# Patient Record
Sex: FEMALE | Race: OTHER | NOT HISPANIC OR LATINO
[De-identification: names, ages, dates, MRNs, and addresses within clinical notes are randomized per-mention and may not be internally consistent; named-entity substitution may affect disease eponyms.]

---

## 2018-11-27 PROBLEM — Z00.00 ENCOUNTER FOR PREVENTIVE HEALTH EXAMINATION: Status: ACTIVE | Noted: 2018-11-27

## 2018-11-28 PROBLEM — N18.6 ESRD (END STAGE RENAL DISEASE) ON DIALYSIS: Status: RESOLVED | Noted: 2018-11-28 | Resolved: 2018-11-28

## 2018-11-28 PROBLEM — Z86.39 HISTORY OF HYPERLIPIDEMIA: Status: RESOLVED | Noted: 2018-11-28 | Resolved: 2018-11-28

## 2018-11-28 PROBLEM — Z86.711 HISTORY OF PULMONARY EMBOLISM: Status: RESOLVED | Noted: 2018-11-28 | Resolved: 2018-11-28

## 2018-11-28 PROBLEM — Z86.79 HISTORY OF HYPERTENSION: Status: RESOLVED | Noted: 2018-11-28 | Resolved: 2018-11-28

## 2018-11-28 PROBLEM — Z87.891 FORMER SMOKER: Status: ACTIVE | Noted: 2018-11-28

## 2018-12-05 ENCOUNTER — APPOINTMENT (OUTPATIENT)
Dept: CARDIOTHORACIC SURGERY | Facility: CLINIC | Age: 53
End: 2018-12-05
Payer: MEDICAID

## 2018-12-05 VITALS
RESPIRATION RATE: 18 BRPM | TEMPERATURE: 97.7 F | SYSTOLIC BLOOD PRESSURE: 161 MMHG | OXYGEN SATURATION: 95 % | HEIGHT: 61 IN | HEART RATE: 77 BPM | WEIGHT: 185 LBS | BODY MASS INDEX: 34.93 KG/M2 | DIASTOLIC BLOOD PRESSURE: 65 MMHG

## 2018-12-05 DIAGNOSIS — Z83.3 FAMILY HISTORY OF DIABETES MELLITUS: ICD-10-CM

## 2018-12-05 DIAGNOSIS — Z87.891 PERSONAL HISTORY OF NICOTINE DEPENDENCE: ICD-10-CM

## 2018-12-05 DIAGNOSIS — R06.09 OTHER FORMS OF DYSPNEA: ICD-10-CM

## 2018-12-05 DIAGNOSIS — I35.0 NONRHEUMATIC AORTIC (VALVE) STENOSIS: ICD-10-CM

## 2018-12-05 DIAGNOSIS — Z86.39 PERSONAL HISTORY OF OTHER ENDOCRINE, NUTRITIONAL AND METABOLIC DISEASE: ICD-10-CM

## 2018-12-05 DIAGNOSIS — Z99.2 END STAGE RENAL DISEASE: ICD-10-CM

## 2018-12-05 DIAGNOSIS — I50.9 HEART FAILURE, UNSPECIFIED: ICD-10-CM

## 2018-12-05 DIAGNOSIS — Z86.79 PERSONAL HISTORY OF OTHER DISEASES OF THE CIRCULATORY SYSTEM: ICD-10-CM

## 2018-12-05 DIAGNOSIS — Q24.5 MALFORMATION OF CORONARY VESSELS: ICD-10-CM

## 2018-12-05 DIAGNOSIS — R60.0 LOCALIZED EDEMA: ICD-10-CM

## 2018-12-05 DIAGNOSIS — Z86.711 PERSONAL HISTORY OF PULMONARY EMBOLISM: ICD-10-CM

## 2018-12-05 DIAGNOSIS — N18.6 END STAGE RENAL DISEASE: ICD-10-CM

## 2018-12-05 DIAGNOSIS — I20.89 OTHER FORMS OF ANGINA PECTORIS: ICD-10-CM

## 2018-12-05 PROCEDURE — 99205 OFFICE O/P NEW HI 60 MIN: CPT

## 2018-12-06 ENCOUNTER — FORM ENCOUNTER (OUTPATIENT)
Age: 53
End: 2018-12-06

## 2018-12-06 PROBLEM — Z83.3 FAMILY HISTORY OF DIABETES MELLITUS: Status: ACTIVE | Noted: 2018-12-06

## 2018-12-06 PROBLEM — I50.9 CHF NYHA CLASS III: Status: ACTIVE | Noted: 2018-12-06

## 2018-12-06 PROBLEM — R06.09 DOE (DYSPNEA ON EXERTION): Status: ACTIVE | Noted: 2018-12-06

## 2018-12-06 PROBLEM — Q24.5 ANOMALOUS RIGHT CORONARY ARTERY: Status: ACTIVE | Noted: 2018-12-06

## 2018-12-06 PROBLEM — R60.0 BILATERAL LOWER EXTREMITY EDEMA: Status: ACTIVE | Noted: 2018-12-06

## 2018-12-06 RX ORDER — ASPIRIN ENTERIC COATED TABLETS 81 MG 81 MG/1
81 TABLET, DELAYED RELEASE ORAL DAILY
Qty: 30 | Refills: 3 | Status: ACTIVE | COMMUNITY

## 2018-12-06 RX ORDER — ATORVASTATIN CALCIUM 40 MG/1
40 TABLET, FILM COATED ORAL
Qty: 30 | Refills: 1 | Status: ACTIVE | COMMUNITY

## 2018-12-06 RX ORDER — ENALAPRIL MALEATE 10 MG/1
10 TABLET ORAL DAILY
Qty: 30 | Refills: 0 | Status: ACTIVE | COMMUNITY

## 2018-12-06 RX ORDER — AMLODIPINE BESYLATE 10 MG/1
10 TABLET ORAL DAILY
Qty: 1 | Refills: 0 | Status: ACTIVE | COMMUNITY

## 2018-12-06 RX ORDER — GLIPIZIDE 5 MG/1
5 TABLET ORAL DAILY
Refills: 0 | Status: ACTIVE | COMMUNITY

## 2018-12-06 RX ORDER — METOPROLOL SUCCINATE 25 MG/1
25 TABLET, EXTENDED RELEASE ORAL
Refills: 0 | Status: ACTIVE | COMMUNITY

## 2018-12-06 RX ORDER — FUROSEMIDE 40 MG/1
40 TABLET ORAL
Refills: 0 | Status: ACTIVE | COMMUNITY

## 2018-12-07 ENCOUNTER — APPOINTMENT (OUTPATIENT)
Dept: CT IMAGING | Facility: HOSPITAL | Age: 53
End: 2018-12-07
Payer: MEDICAID

## 2018-12-07 ENCOUNTER — APPOINTMENT (OUTPATIENT)
Dept: ULTRASOUND IMAGING | Facility: HOSPITAL | Age: 53
End: 2018-12-07
Payer: MEDICAID

## 2018-12-07 ENCOUNTER — OUTPATIENT (OUTPATIENT)
Dept: OUTPATIENT SERVICES | Facility: HOSPITAL | Age: 53
LOS: 1 days | End: 2018-12-07
Payer: MEDICAID

## 2018-12-07 ENCOUNTER — NON-APPOINTMENT (OUTPATIENT)
Age: 53
End: 2018-12-07

## 2018-12-07 DIAGNOSIS — I35.0 NONRHEUMATIC AORTIC (VALVE) STENOSIS: ICD-10-CM

## 2018-12-07 DIAGNOSIS — Q24.5 MALFORMATION OF CORONARY VESSELS: ICD-10-CM

## 2018-12-07 LAB
ALBUMIN SERPL ELPH-MCNC: 4.2 G/DL — SIGNIFICANT CHANGE UP (ref 3.3–5)
ALP SERPL-CCNC: 110 U/L — SIGNIFICANT CHANGE UP (ref 40–120)
ALT FLD-CCNC: 11 U/L — SIGNIFICANT CHANGE UP (ref 10–45)
ANION GAP SERPL CALC-SCNC: 18 MMOL/L — HIGH (ref 5–17)
APTT BLD: 28.1 SEC — SIGNIFICANT CHANGE UP (ref 27.5–36.3)
AST SERPL-CCNC: 11 U/L — SIGNIFICANT CHANGE UP (ref 10–40)
BASOPHILS NFR BLD AUTO: 0.1 % — SIGNIFICANT CHANGE UP (ref 0–2)
BILIRUB SERPL-MCNC: 0.3 MG/DL — SIGNIFICANT CHANGE UP (ref 0.2–1.2)
BLD GP AB SCN SERPL QL: NEGATIVE — SIGNIFICANT CHANGE UP
BUN SERPL-MCNC: 44 MG/DL — HIGH (ref 7–23)
CALCIUM SERPL-MCNC: 10.4 MG/DL — SIGNIFICANT CHANGE UP (ref 8.4–10.5)
CHLORIDE SERPL-SCNC: 94 MMOL/L — LOW (ref 96–108)
CO2 SERPL-SCNC: 22 MMOL/L — SIGNIFICANT CHANGE UP (ref 22–31)
CREAT SERPL-MCNC: 5.39 MG/DL — HIGH (ref 0.5–1.3)
EOSINOPHIL NFR BLD AUTO: 2.8 % — SIGNIFICANT CHANGE UP (ref 0–6)
GLUCOSE SERPL-MCNC: 178 MG/DL — HIGH (ref 70–99)
HBA1C BLD-MCNC: 9.1 % — HIGH (ref 4–5.6)
HBV SURFACE AG SER-ACNC: SIGNIFICANT CHANGE UP
HCT VFR BLD CALC: 36.5 % — SIGNIFICANT CHANGE UP (ref 34.5–45)
HGB BLD-MCNC: 11.7 G/DL — SIGNIFICANT CHANGE UP (ref 11.5–15.5)
INR BLD: 1.07 — SIGNIFICANT CHANGE UP (ref 0.88–1.16)
LYMPHOCYTES # BLD AUTO: 20.2 % — SIGNIFICANT CHANGE UP (ref 13–44)
MCHC RBC-ENTMCNC: 28.2 PG — SIGNIFICANT CHANGE UP (ref 27–34)
MCHC RBC-ENTMCNC: 32.1 G/DL — SIGNIFICANT CHANGE UP (ref 32–36)
MCV RBC AUTO: 88 FL — SIGNIFICANT CHANGE UP (ref 80–100)
MONOCYTES NFR BLD AUTO: 6.3 % — SIGNIFICANT CHANGE UP (ref 2–14)
NEUTROPHILS NFR BLD AUTO: 70.6 % — SIGNIFICANT CHANGE UP (ref 43–77)
PLATELET # BLD AUTO: 149 K/UL — LOW (ref 150–400)
POTASSIUM SERPL-MCNC: 4.6 MMOL/L — SIGNIFICANT CHANGE UP (ref 3.5–5.3)
POTASSIUM SERPL-SCNC: 4.6 MMOL/L — SIGNIFICANT CHANGE UP (ref 3.5–5.3)
PROT SERPL-MCNC: 8.3 G/DL — SIGNIFICANT CHANGE UP (ref 6–8.3)
PROTHROM AB SERPL-ACNC: 12.1 SEC — SIGNIFICANT CHANGE UP (ref 10–12.9)
RBC # BLD: 4.15 M/UL — SIGNIFICANT CHANGE UP (ref 3.8–5.2)
RBC # FLD: 14.9 % — SIGNIFICANT CHANGE UP (ref 10.3–16.9)
RH IG SCN BLD-IMP: POSITIVE — SIGNIFICANT CHANGE UP
SODIUM SERPL-SCNC: 134 MMOL/L — LOW (ref 135–145)
TSH SERPL-MCNC: 2.64 UIU/ML — SIGNIFICANT CHANGE UP (ref 0.35–4.94)
WBC # BLD: 7.5 K/UL — SIGNIFICANT CHANGE UP (ref 3.8–10.5)
WBC # FLD AUTO: 7.5 K/UL — SIGNIFICANT CHANGE UP (ref 3.8–10.5)

## 2018-12-07 PROCEDURE — 94760 N-INVAS EAR/PLS OXIMETRY 1: CPT

## 2018-12-07 PROCEDURE — 94727 GAS DIL/WSHOT DETER LNG VOL: CPT | Mod: 26

## 2018-12-07 PROCEDURE — 71046 X-RAY EXAM CHEST 2 VIEWS: CPT | Mod: 26

## 2018-12-07 PROCEDURE — 94726 PLETHYSMOGRAPHY LUNG VOLUMES: CPT

## 2018-12-07 PROCEDURE — 75574 CT ANGIO HRT W/3D IMAGE: CPT | Mod: 26

## 2018-12-07 PROCEDURE — 94060 EVALUATION OF WHEEZING: CPT

## 2018-12-07 PROCEDURE — 93880 EXTRACRANIAL BILAT STUDY: CPT | Mod: 26

## 2018-12-07 PROCEDURE — 94010 BREATHING CAPACITY TEST: CPT | Mod: 26

## 2018-12-07 PROCEDURE — 71046 X-RAY EXAM CHEST 2 VIEWS: CPT

## 2018-12-07 PROCEDURE — 93880 EXTRACRANIAL BILAT STUDY: CPT

## 2018-12-07 PROCEDURE — 94729 DIFFUSING CAPACITY: CPT | Mod: 26

## 2018-12-07 PROCEDURE — 94729 DIFFUSING CAPACITY: CPT

## 2018-12-07 PROCEDURE — 75574 CT ANGIO HRT W/3D IMAGE: CPT

## 2018-12-10 VITALS
SYSTOLIC BLOOD PRESSURE: 161 MMHG | HEART RATE: 77 BPM | OXYGEN SATURATION: 95 % | DIASTOLIC BLOOD PRESSURE: 65 MMHG | HEIGHT: 61 IN | WEIGHT: 184.97 LBS | RESPIRATION RATE: 16 BRPM | TEMPERATURE: 98 F

## 2018-12-10 PROBLEM — Z86.39 HISTORY OF DIABETES MELLITUS: Status: RESOLVED | Noted: 2018-12-10 | Resolved: 2018-12-10

## 2018-12-10 NOTE — H&P ADULT - ASSESSMENT
53 year old female, former smoker, with a past medical history of hypertension, DM, hyperlipidemia, PE (2016, on anticoagulation for 6 months), ESRD on dialysis (Tues, Thurs, Sat.), iron deficient anemia, consulted by CT Surgery for evaluation of aortic stenosis and an anomalous RCA.     Dr. Nascimento reviewed the echocardiogram and cardiac cath images with the patient and son and discussed the case with Dr. India Haley. Dr. Nascimento discussed the risks, benefits and alternatives to surgery. Risks include but not limited to death, heart attack, bleeding, stroke, kidney problems and infection. He quoted a 2-3% operative mortality and complication risk. Dr. Nascimento feels the patient will benefit and is a candidate for Sternotomy, AVR, Unroofing of Right Coronary Artery, Possible Bypass. All questions were addressed and patient presents today for elective surgery.     Plan  Coronary CTA, PFT, CXR, labs, EKG completed  Patient instructed to continue Aspirin  HD Tues/Thurs/Saturday with short run of dialysis as outpatient on Monday 12/10/18  3 soaps given  SDA- Sternotomy, AVR, Unroofing of Right Coronary Artery, Possible Bypass 53 year old female, former smoker, with a past medical history of hypertension, DM, hyperlipidemia, PE (2016, on anticoagulation for 6 months), ESRD on dialysis (Tues, Thurs, Sat.), iron deficient anemia, consulted by CT Surgery for evaluation of aortic stenosis and an anomalous RCA.     Dr. Nascimento reviewed the echocardiogram and cardiac cath images with the patient and son and discussed the case with Dr. India Haley. Dr. Nascimento discussed the risks, benefits and alternatives to surgery. Risks include but not limited to death, heart attack, bleeding, stroke, kidney problems and infection. He quoted a 2-3% operative mortality and complication risk. Dr. Nascimento feels the patient will benefit and is a candidate for Sternotomy, AVR, Unroofing of Right Coronary Artery, Possible Bypass. All questions were addressed and patient presents today for elective surgery.     Plan  Coronary CTA, PFT, CXR, labs, EKG completed  Patient instructed to continue Aspirin  HD Tues/Thurs/Saturday   3 soaps given  SDA- Sternotomy, AVR, Unroofing of Right Coronary Artery, Possible Bypass

## 2018-12-10 NOTE — H&P ADULT - PMH
Diabetes    ESRD (end stage renal disease)    Essential hypertension    Hemodialysis access, AV graft    Hyperlipemia    Kidney failure    Pulmonary embolism

## 2018-12-10 NOTE — H&P ADULT - NSHPLABSRESULTS_GEN_ALL_CORE
Hgb A1C =9.1  creat = 5.39  hct= 36.5  hgb= 11.7  plt= 149  WBC= 7.5  INR= 1.07  tot alb=4.2  tot bili=0.3    TSH= 2.644    pft:FEV1 1.59, 69% pred  Carotids: no hemodynamically significant carotid stenosis    Chest xray: lungs clear  EKG: NSR HR 76      Echocardiogram 11/7/18 revealed: LVEF 55-60%. aortic valve is trileaflet and is moderately thickened. moderate aortic regurgitation. severe aortic stenosis with a peak gradient of 77.43mmhg and mean gradient of 49.28mmHg.     Cardiac catheterization on 11/7/18 revealed: nonobstructive CAD. 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    CT Angio heart with coronaries: moderate coronary artery calcification, 6 mm tubular shaped nodule within the left lower lobe with some adjacent tree in bud micronodular opacities compatible with small large airway inflammation (repeat CT in 6-12 months recommendation), anomalous RCA from left coronary cusp with acute angle takeoff and slit like orifice

## 2018-12-10 NOTE — H&P ADULT - HISTORY OF PRESENT ILLNESS
53 year old female, Croatian speaking, former smoker, with a past medical history of hypertension, hyperlipidemia, PE (2016, on anticoagulation for 6 months), ESRD on dialysis (Tues, Thurs, Sat.), iron deficient anemia, consulted by CT Surgery for management of aortic stenosis and an anomalous RCA.     Patient presented to her cardiologist Dr. India Haley complaining of exertional dyspnea and angina with moderate activity (beginning since April). She reports that she gets winded after walking up 1 flight of stairs and 1-2 city blocks. She also endorses bilateral lower extremity edema. Patient denies any fever, chills, fatigue, syncope, acute chest pain, palpitations, SOB, orthopnea, paroxysmal nocturnal dyspnea, vomiting, abdominal pain, or back pain.    Due to patient's symptoms, she underwent the tests below:    Cardiac catheterization on 11/7/18 revealed: nonobstructive CAD. 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     Echocardiogram 11/7/18 revealed: LVEF 55-60%. aortic valve is trileaflet and is moderately thickened. moderate aortic regurgitation. severe aortic stenosis with a peak gradient of 77.43mmhg and mean gradient of 49.28mmHg.     Patient lives with son. She remains independent with all her ADLs. She admits to a mild activity level due to her symptoms. She presents today for elective surgery.    ***Patient receives hemodialysis on Tuesday, Thursday, and Saturday as an outpatient. She received an extra course of HD on Monday 12/10/18. 53 year old female, Scottish speaking, former smoker, with a past medical history of hypertension, hyperlipidemia, PE (2016, on anticoagulation for 6 months), ESRD on dialysis (Tues, Thurs, Sat.), iron deficient anemia, consulted by CT Surgery for management of aortic stenosis and an anomalous RCA.     Patient presented to her cardiologist Dr. India Haley complaining of exertional dyspnea and angina with moderate activity (beginning since April). She reports that she gets winded after walking up 1 flight of stairs and 1-2 city blocks. She also endorses bilateral lower extremity edema. Patient denies any fever, chills, fatigue, syncope, acute chest pain, palpitations, SOB, orthopnea, paroxysmal nocturnal dyspnea, vomiting, abdominal pain, or back pain.    Due to patient's symptoms, she underwent the tests below:    Cardiac catheterization on 11/7/18 revealed: nonobstructive CAD. 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     Echocardiogram 11/7/18 revealed: LVEF 55-60%. aortic valve is trileaflet and is moderately thickened. moderate aortic regurgitation. severe aortic stenosis with a peak gradient of 77.43mmhg and mean gradient of 49.28mmHg.     Patient lives with son. She remains independent with all her ADLs. She admits to a mild activity level due to her symptoms. She presents today for elective surgery.    ***Patient receives hemodialysis on Tuesday, Thursday, and Saturday as an outpatient.

## 2018-12-17 ENCOUNTER — INPATIENT (INPATIENT)
Facility: HOSPITAL | Age: 53
LOS: 2 days | DRG: 219 | End: 2018-12-20
Attending: THORACIC SURGERY (CARDIOTHORACIC VASCULAR SURGERY) | Admitting: THORACIC SURGERY (CARDIOTHORACIC VASCULAR SURGERY)
Payer: MEDICAID

## 2018-12-17 ENCOUNTER — APPOINTMENT (OUTPATIENT)
Dept: CARDIOTHORACIC SURGERY | Facility: HOSPITAL | Age: 53
End: 2018-12-17
Payer: MEDICAID

## 2018-12-17 ENCOUNTER — RESULT REVIEW (OUTPATIENT)
Age: 53
End: 2018-12-17

## 2018-12-17 DIAGNOSIS — D64.9 ANEMIA, UNSPECIFIED: ICD-10-CM

## 2018-12-17 DIAGNOSIS — I10 ESSENTIAL (PRIMARY) HYPERTENSION: ICD-10-CM

## 2018-12-17 DIAGNOSIS — N25.0 RENAL OSTEODYSTROPHY: ICD-10-CM

## 2018-12-17 DIAGNOSIS — N18.6 END STAGE RENAL DISEASE: ICD-10-CM

## 2018-12-17 DIAGNOSIS — Z98.891 HISTORY OF UTERINE SCAR FROM PREVIOUS SURGERY: Chronic | ICD-10-CM

## 2018-12-17 LAB
ALBUMIN SERPL ELPH-MCNC: 2.7 G/DL — LOW (ref 3.3–5)
ALBUMIN SERPL ELPH-MCNC: 3.3 G/DL — SIGNIFICANT CHANGE UP (ref 3.3–5)
ALP SERPL-CCNC: 56 U/L — SIGNIFICANT CHANGE UP (ref 40–120)
ALP SERPL-CCNC: 76 U/L — SIGNIFICANT CHANGE UP (ref 40–120)
ALT FLD-CCNC: 15 U/L — SIGNIFICANT CHANGE UP (ref 10–45)
ALT FLD-CCNC: 19 U/L — SIGNIFICANT CHANGE UP (ref 10–45)
ANION GAP SERPL CALC-SCNC: 20 MMOL/L — HIGH (ref 5–17)
ANION GAP SERPL CALC-SCNC: 23 MMOL/L — HIGH (ref 5–17)
APTT BLD: 28.7 SEC — SIGNIFICANT CHANGE UP (ref 27.5–36.3)
APTT BLD: 29.5 SEC — SIGNIFICANT CHANGE UP (ref 27.5–36.3)
AST SERPL-CCNC: 41 U/L — HIGH (ref 10–40)
AST SERPL-CCNC: 45 U/L — HIGH (ref 10–40)
BASE EXCESS BLDA CALC-SCNC: -4.8 MMOL/L — LOW (ref -2–3)
BASOPHILS NFR BLD AUTO: 0.1 % — SIGNIFICANT CHANGE UP (ref 0–2)
BILIRUB SERPL-MCNC: 0.6 MG/DL — SIGNIFICANT CHANGE UP (ref 0.2–1.2)
BILIRUB SERPL-MCNC: 0.6 MG/DL — SIGNIFICANT CHANGE UP (ref 0.2–1.2)
BLD GP AB SCN SERPL QL: NEGATIVE — SIGNIFICANT CHANGE UP
BUN SERPL-MCNC: 41 MG/DL — HIGH (ref 7–23)
BUN SERPL-MCNC: 49 MG/DL — HIGH (ref 7–23)
CALCIUM SERPL-MCNC: 10.1 MG/DL — SIGNIFICANT CHANGE UP (ref 8.4–10.5)
CALCIUM SERPL-MCNC: 10.8 MG/DL — HIGH (ref 8.4–10.5)
CHLORIDE SERPL-SCNC: 102 MMOL/L — SIGNIFICANT CHANGE UP (ref 96–108)
CHLORIDE SERPL-SCNC: 105 MMOL/L — SIGNIFICANT CHANGE UP (ref 96–108)
CO2 SERPL-SCNC: 19 MMOL/L — LOW (ref 22–31)
CO2 SERPL-SCNC: 21 MMOL/L — LOW (ref 22–31)
CREAT SERPL-MCNC: 4.49 MG/DL — HIGH (ref 0.5–1.3)
CREAT SERPL-MCNC: 5.03 MG/DL — HIGH (ref 0.5–1.3)
EOSINOPHIL NFR BLD AUTO: 0.2 % — SIGNIFICANT CHANGE UP (ref 0–6)
GAS PNL BLDA: SIGNIFICANT CHANGE UP
GAS PNL BLDV: SIGNIFICANT CHANGE UP
GLUCOSE BLDC GLUCOMTR-MCNC: 151 MG/DL — HIGH (ref 70–99)
GLUCOSE BLDC GLUCOMTR-MCNC: 163 MG/DL — HIGH (ref 70–99)
GLUCOSE BLDC GLUCOMTR-MCNC: 225 MG/DL — HIGH (ref 70–99)
GLUCOSE BLDC GLUCOMTR-MCNC: 254 MG/DL — HIGH (ref 70–99)
GLUCOSE BLDC GLUCOMTR-MCNC: 276 MG/DL — HIGH (ref 70–99)
GLUCOSE SERPL-MCNC: 188 MG/DL — HIGH (ref 70–99)
GLUCOSE SERPL-MCNC: 256 MG/DL — HIGH (ref 70–99)
HBV CORE IGM SER-ACNC: SIGNIFICANT CHANGE UP
HBV SURFACE AB SER-ACNC: REACTIVE — SIGNIFICANT CHANGE UP
HBV SURFACE AG SER-ACNC: SIGNIFICANT CHANGE UP
HCG SERPL-ACNC: 11.6 MIU/ML — HIGH
HCO3 BLDA-SCNC: 22 MMOL/L — SIGNIFICANT CHANGE UP (ref 21–28)
HCT VFR BLD CALC: 28.2 % — LOW (ref 34.5–45)
HCT VFR BLD CALC: 30 % — LOW (ref 34.5–45)
HCV AB S/CO SERPL IA: 0.19 S/CO — SIGNIFICANT CHANGE UP
HCV AB SERPL-IMP: SIGNIFICANT CHANGE UP
HGB BLD-MCNC: 9.1 G/DL — LOW (ref 11.5–15.5)
HGB BLD-MCNC: 9.9 G/DL — LOW (ref 11.5–15.5)
INR BLD: 1.23 — HIGH (ref 0.88–1.16)
INR BLD: 1.3 — HIGH (ref 0.88–1.16)
LACTATE SERPL-SCNC: 5.1 MMOL/L — CRITICAL HIGH (ref 0.5–2)
LACTATE SERPL-SCNC: 6.7 MMOL/L — CRITICAL HIGH (ref 0.5–2)
LYMPHOCYTES # BLD AUTO: 8.1 % — LOW (ref 13–44)
MAGNESIUM SERPL-MCNC: 2.3 MG/DL — SIGNIFICANT CHANGE UP (ref 1.6–2.6)
MAGNESIUM SERPL-MCNC: 2.5 MG/DL — SIGNIFICANT CHANGE UP (ref 1.6–2.6)
MCHC RBC-ENTMCNC: 28.6 PG — SIGNIFICANT CHANGE UP (ref 27–34)
MCHC RBC-ENTMCNC: 29.4 PG — SIGNIFICANT CHANGE UP (ref 27–34)
MCHC RBC-ENTMCNC: 32.3 G/DL — SIGNIFICANT CHANGE UP (ref 32–36)
MCHC RBC-ENTMCNC: 33 G/DL — SIGNIFICANT CHANGE UP (ref 32–36)
MCV RBC AUTO: 88.7 FL — SIGNIFICANT CHANGE UP (ref 80–100)
MCV RBC AUTO: 89 FL — SIGNIFICANT CHANGE UP (ref 80–100)
MONOCYTES NFR BLD AUTO: 8.2 % — SIGNIFICANT CHANGE UP (ref 2–14)
NEUTROPHILS NFR BLD AUTO: 83.4 % — HIGH (ref 43–77)
PCO2 BLDA: 48 MMHG — HIGH (ref 32–45)
PH BLDA: 7.28 — LOW (ref 7.35–7.45)
PHOSPHATE SERPL-MCNC: 1.7 MG/DL — LOW (ref 2.5–4.5)
PHOSPHATE SERPL-MCNC: 3.4 MG/DL — SIGNIFICANT CHANGE UP (ref 2.5–4.5)
PLATELET # BLD AUTO: 106 K/UL — LOW (ref 150–400)
PLATELET # BLD AUTO: 167 K/UL — SIGNIFICANT CHANGE UP (ref 150–400)
PO2 BLDA: 482 MMHG — HIGH (ref 83–108)
POTASSIUM SERPL-MCNC: 3.5 MMOL/L — SIGNIFICANT CHANGE UP (ref 3.5–5.3)
POTASSIUM SERPL-MCNC: 3.5 MMOL/L — SIGNIFICANT CHANGE UP (ref 3.5–5.3)
POTASSIUM SERPL-SCNC: 3.5 MMOL/L — SIGNIFICANT CHANGE UP (ref 3.5–5.3)
POTASSIUM SERPL-SCNC: 3.5 MMOL/L — SIGNIFICANT CHANGE UP (ref 3.5–5.3)
PROT SERPL-MCNC: 5.3 G/DL — LOW (ref 6–8.3)
PROT SERPL-MCNC: 6.1 G/DL — SIGNIFICANT CHANGE UP (ref 6–8.3)
PROTHROM AB SERPL-ACNC: 14 SEC — HIGH (ref 10–12.9)
PROTHROM AB SERPL-ACNC: 14.8 SEC — HIGH (ref 10–12.9)
RBC # BLD: 3.18 M/UL — LOW (ref 3.8–5.2)
RBC # BLD: 3.37 M/UL — LOW (ref 3.8–5.2)
RBC # FLD: 14.1 % — SIGNIFICANT CHANGE UP (ref 10.3–16.9)
RBC # FLD: 14.5 % — SIGNIFICANT CHANGE UP (ref 10.3–16.9)
RH IG SCN BLD-IMP: POSITIVE — SIGNIFICANT CHANGE UP
SAO2 % BLDA: 100 % — SIGNIFICANT CHANGE UP (ref 95–100)
SODIUM SERPL-SCNC: 144 MMOL/L — SIGNIFICANT CHANGE UP (ref 135–145)
SODIUM SERPL-SCNC: 146 MMOL/L — HIGH (ref 135–145)
WBC # BLD: 12.3 K/UL — HIGH (ref 3.8–10.5)
WBC # BLD: 9.4 K/UL — SIGNIFICANT CHANGE UP (ref 3.8–10.5)
WBC # FLD AUTO: 12.3 K/UL — HIGH (ref 3.8–10.5)
WBC # FLD AUTO: 9.4 K/UL — SIGNIFICANT CHANGE UP (ref 3.8–10.5)

## 2018-12-17 PROCEDURE — 93010 ELECTROCARDIOGRAM REPORT: CPT

## 2018-12-17 PROCEDURE — 33426 REPAIR OF MITRAL VALVE: CPT | Mod: 80

## 2018-12-17 PROCEDURE — 71045 X-RAY EXAM CHEST 1 VIEW: CPT | Mod: 26,59,76

## 2018-12-17 PROCEDURE — 86901 BLOOD TYPING SEROLOGIC RH(D): CPT

## 2018-12-17 PROCEDURE — 86850 RBC ANTIBODY SCREEN: CPT

## 2018-12-17 PROCEDURE — 80053 COMPREHEN METABOLIC PANEL: CPT

## 2018-12-17 PROCEDURE — 33405 REPLACEMENT AORTIC VALVE OPN: CPT | Mod: 80,59

## 2018-12-17 PROCEDURE — 86900 BLOOD TYPING SEROLOGIC ABO: CPT

## 2018-12-17 PROCEDURE — 93306 TTE W/DOPPLER COMPLETE: CPT | Mod: 26,77

## 2018-12-17 PROCEDURE — 84443 ASSAY THYROID STIM HORMONE: CPT

## 2018-12-17 PROCEDURE — 85610 PROTHROMBIN TIME: CPT

## 2018-12-17 PROCEDURE — 83036 HEMOGLOBIN GLYCOSYLATED A1C: CPT

## 2018-12-17 PROCEDURE — 36430 TRANSFUSION BLD/BLD COMPNT: CPT

## 2018-12-17 PROCEDURE — 33507 REPAIR ART INTRAMURAL: CPT | Mod: 80,59

## 2018-12-17 PROCEDURE — 33405 REPLACEMENT AORTIC VALVE OPN: CPT | Mod: 59

## 2018-12-17 PROCEDURE — 85025 COMPLETE CBC W/AUTO DIFF WBC: CPT

## 2018-12-17 PROCEDURE — 87340 HEPATITIS B SURFACE AG IA: CPT

## 2018-12-17 PROCEDURE — 36556 INSERT NON-TUNNEL CV CATH: CPT

## 2018-12-17 PROCEDURE — 36415 COLL VENOUS BLD VENIPUNCTURE: CPT

## 2018-12-17 PROCEDURE — 93306 TTE W/DOPPLER COMPLETE: CPT | Mod: 26

## 2018-12-17 PROCEDURE — 33426 REPAIR OF MITRAL VALVE: CPT

## 2018-12-17 PROCEDURE — 99291 CRITICAL CARE FIRST HOUR: CPT

## 2018-12-17 PROCEDURE — 85730 THROMBOPLASTIN TIME PARTIAL: CPT

## 2018-12-17 PROCEDURE — 33507 REPAIR ART INTRAMURAL: CPT | Mod: 59

## 2018-12-17 RX ORDER — METOPROLOL TARTRATE 50 MG
1 TABLET ORAL
Qty: 0 | Refills: 0 | COMMUNITY

## 2018-12-17 RX ORDER — HEPARIN SODIUM 5000 [USP'U]/ML
5000 INJECTION INTRAVENOUS; SUBCUTANEOUS EVERY 8 HOURS
Qty: 0 | Refills: 0 | Status: DISCONTINUED | OUTPATIENT
Start: 2018-12-17 | End: 2018-12-19

## 2018-12-17 RX ORDER — SODIUM BICARBONATE 1 MEQ/ML
50 SYRINGE (ML) INTRAVENOUS ONCE
Qty: 0 | Refills: 0 | Status: COMPLETED | OUTPATIENT
Start: 2018-12-17 | End: 2018-12-17

## 2018-12-17 RX ORDER — AMLODIPINE BESYLATE 2.5 MG/1
1 TABLET ORAL
Qty: 0 | Refills: 0 | COMMUNITY

## 2018-12-17 RX ORDER — CHLORHEXIDINE GLUCONATE 213 G/1000ML
5 SOLUTION TOPICAL EVERY 4 HOURS
Qty: 0 | Refills: 0 | Status: DISCONTINUED | OUTPATIENT
Start: 2018-12-17 | End: 2018-12-20

## 2018-12-17 RX ORDER — DEXTROSE 50 % IN WATER 50 %
50 SYRINGE (ML) INTRAVENOUS
Qty: 0 | Refills: 0 | Status: DISCONTINUED | OUTPATIENT
Start: 2018-12-17 | End: 2018-12-20

## 2018-12-17 RX ORDER — CEFAZOLIN SODIUM 1 G
2000 VIAL (EA) INJECTION EVERY 8 HOURS
Qty: 0 | Refills: 0 | Status: DISCONTINUED | OUTPATIENT
Start: 2018-12-17 | End: 2018-12-18

## 2018-12-17 RX ORDER — ATORVASTATIN CALCIUM 80 MG/1
1 TABLET, FILM COATED ORAL
Qty: 0 | Refills: 0 | COMMUNITY

## 2018-12-17 RX ORDER — AMIODARONE HYDROCHLORIDE 400 MG/1
1 TABLET ORAL
Qty: 900 | Refills: 0 | Status: DISCONTINUED | OUTPATIENT
Start: 2018-12-17 | End: 2018-12-18

## 2018-12-17 RX ORDER — FUROSEMIDE 40 MG
1 TABLET ORAL
Qty: 0 | Refills: 0 | COMMUNITY

## 2018-12-17 RX ORDER — POTASSIUM PHOSPHATE, MONOBASIC POTASSIUM PHOSPHATE, DIBASIC 236; 224 MG/ML; MG/ML
15 INJECTION, SOLUTION INTRAVENOUS ONCE
Qty: 0 | Refills: 0 | Status: COMPLETED | OUTPATIENT
Start: 2018-12-17 | End: 2018-12-18

## 2018-12-17 RX ORDER — CISATRACURIUM BESYLATE 2 MG/ML
10 INJECTION INTRAVENOUS ONCE
Qty: 0 | Refills: 0 | Status: COMPLETED | OUTPATIENT
Start: 2018-12-17 | End: 2018-12-17

## 2018-12-17 RX ORDER — SODIUM CHLORIDE 9 MG/ML
1000 INJECTION INTRAMUSCULAR; INTRAVENOUS; SUBCUTANEOUS
Qty: 0 | Refills: 0 | Status: DISCONTINUED | OUTPATIENT
Start: 2018-12-17 | End: 2018-12-20

## 2018-12-17 RX ORDER — ALBUMIN HUMAN 25 %
50 VIAL (ML) INTRAVENOUS ONCE
Qty: 0 | Refills: 0 | Status: DISCONTINUED | OUTPATIENT
Start: 2018-12-17 | End: 2018-12-19

## 2018-12-17 RX ORDER — INSULIN GLARGINE 100 [IU]/ML
0 INJECTION, SOLUTION SUBCUTANEOUS
Qty: 0 | Refills: 0 | COMMUNITY

## 2018-12-17 RX ORDER — CHLORHEXIDINE GLUCONATE 213 G/1000ML
1 SOLUTION TOPICAL DAILY
Qty: 0 | Refills: 0 | Status: DISCONTINUED | OUTPATIENT
Start: 2018-12-17 | End: 2018-12-20

## 2018-12-17 RX ORDER — MILRINONE LACTATE 1 MG/ML
0.25 INJECTION, SOLUTION INTRAVENOUS
Qty: 20 | Refills: 0 | Status: DISCONTINUED | OUTPATIENT
Start: 2018-12-17 | End: 2018-12-20

## 2018-12-17 RX ORDER — ASPIRIN/CALCIUM CARB/MAGNESIUM 324 MG
1 TABLET ORAL
Qty: 0 | Refills: 0 | COMMUNITY

## 2018-12-17 RX ORDER — NOREPINEPHRINE BITARTRATE/D5W 8 MG/250ML
0.02 PLASTIC BAG, INJECTION (ML) INTRAVENOUS
Qty: 8 | Refills: 0 | Status: DISCONTINUED | OUTPATIENT
Start: 2018-12-17 | End: 2018-12-20

## 2018-12-17 RX ORDER — FENTANYL CITRATE 50 UG/ML
25 INJECTION INTRAVENOUS ONCE
Qty: 0 | Refills: 0 | Status: DISCONTINUED | OUTPATIENT
Start: 2018-12-17 | End: 2018-12-17

## 2018-12-17 RX ORDER — ALBUMIN HUMAN 25 %
VIAL (ML) INTRAVENOUS
Qty: 0 | Refills: 0 | Status: DISCONTINUED | OUTPATIENT
Start: 2018-12-18 | End: 2018-12-19

## 2018-12-17 RX ORDER — DEXTROSE 50 % IN WATER 50 %
25 SYRINGE (ML) INTRAVENOUS
Qty: 0 | Refills: 0 | Status: DISCONTINUED | OUTPATIENT
Start: 2018-12-17 | End: 2018-12-20

## 2018-12-17 RX ORDER — INSULIN HUMAN 100 [IU]/ML
1 INJECTION, SOLUTION SUBCUTANEOUS
Qty: 100 | Refills: 0 | Status: DISCONTINUED | OUTPATIENT
Start: 2018-12-17 | End: 2018-12-20

## 2018-12-17 RX ORDER — CALCIUM CHLORIDE
1000 POWDER (GRAM) MISCELLANEOUS ONCE
Qty: 0 | Refills: 0 | Status: COMPLETED | OUTPATIENT
Start: 2018-12-17 | End: 2018-12-17

## 2018-12-17 RX ORDER — EPINEPHRINE 0.3 MG/.3ML
0.02 INJECTION INTRAMUSCULAR; SUBCUTANEOUS
Qty: 4 | Refills: 0 | Status: DISCONTINUED | OUTPATIENT
Start: 2018-12-17 | End: 2018-12-18

## 2018-12-17 RX ORDER — PANTOPRAZOLE SODIUM 20 MG/1
40 TABLET, DELAYED RELEASE ORAL DAILY
Qty: 0 | Refills: 0 | Status: DISCONTINUED | OUTPATIENT
Start: 2018-12-17 | End: 2018-12-19

## 2018-12-17 RX ORDER — POTASSIUM CHLORIDE 20 MEQ
10 PACKET (EA) ORAL ONCE
Qty: 0 | Refills: 0 | Status: COMPLETED | OUTPATIENT
Start: 2018-12-17 | End: 2018-12-17

## 2018-12-17 RX ORDER — VASOPRESSIN 20 [USP'U]/ML
0.04 INJECTION INTRAVENOUS
Qty: 100 | Refills: 0 | Status: COMPLETED | OUTPATIENT
Start: 2018-12-17 | End: 2018-12-18

## 2018-12-17 RX ORDER — ALBUMIN HUMAN 25 %
50 VIAL (ML) INTRAVENOUS ONCE
Qty: 0 | Refills: 0 | Status: COMPLETED | OUTPATIENT
Start: 2018-12-17 | End: 2018-12-18

## 2018-12-17 RX ORDER — CALCIUM GLUCONATE 100 MG/ML
2 VIAL (ML) INTRAVENOUS ONCE
Qty: 0 | Refills: 0 | Status: DISCONTINUED | OUTPATIENT
Start: 2018-12-17 | End: 2018-12-17

## 2018-12-17 RX ADMIN — Medication 50 MILLIEQUIVALENT(S): at 19:30

## 2018-12-17 RX ADMIN — FENTANYL CITRATE 25 MICROGRAM(S): 50 INJECTION INTRAVENOUS at 21:30

## 2018-12-17 RX ADMIN — CISATRACURIUM BESYLATE 10 MILLIGRAM(S): 2 INJECTION INTRAVENOUS at 22:15

## 2018-12-17 RX ADMIN — Medication 1000 MILLIGRAM(S): at 22:20

## 2018-12-17 RX ADMIN — Medication 100 MILLIGRAM(S): at 23:11

## 2018-12-17 RX ADMIN — Medication 50 MILLIEQUIVALENT(S): at 22:45

## 2018-12-17 RX ADMIN — Medication 50 MILLIEQUIVALENT(S): at 19:15

## 2018-12-17 RX ADMIN — FENTANYL CITRATE 25 MICROGRAM(S): 50 INJECTION INTRAVENOUS at 21:45

## 2018-12-17 RX ADMIN — CHLORHEXIDINE GLUCONATE 5 MILLILITER(S): 213 SOLUTION TOPICAL at 23:11

## 2018-12-17 RX ADMIN — AMIODARONE HYDROCHLORIDE 33.33 MG/MIN: 400 TABLET ORAL at 20:03

## 2018-12-17 NOTE — CONSULT NOTE ADULT - ASSESSMENT
This is 53 year old female with PMH stated above, now s/p aortic valve replacement and mitral valve repair. During the procedure - 4 units PRBC, 4 FFP, 2 plts, 10 cryo, now with stable vital signs on Milrinone. The case discussed with the attending from ICU and the family - all agreeable to receive HD Today for clerance - mild lactic acidosis from the cardiogenic shock

## 2018-12-17 NOTE — PROGRESS NOTE ADULT - SUBJECTIVE AND OBJECTIVE BOX
Patient was seen and evaluated on dialysis.   Patient is tolerating the procedure well.   HR: 89 (12-17-18 @ 21:37)  BP: -- pusle 65, /67  Continue dialysis:   Dialyzer:  180      QB:300        QD: 500  Goal UF no UF over 2 Hours

## 2018-12-17 NOTE — BRIEF OPERATIVE NOTE - POST-OP DX
Anomalous coronary artery origin  12/17/2018  Anomalous right coronary artery  Active  Everardo Robbins  Aortic stenosis  12/17/2018    Active  Everardo Robbins  Mitral regurgitation  12/17/2018    Active  Everardo Robbins

## 2018-12-17 NOTE — PROGRESS NOTE ADULT - SUBJECTIVE AND OBJECTIVE BOX
CTICU  CRITICAL  CARE  attending     Hand off received 					   Pertinent clinical, laboratory, radiographic, hemodynamic, echocardiographic, respiratory data, microbiologic data and chart were reviewed and analyzed frequently throughout the course of the day and night  Patient seen and examined with CTS/ SH attending at bedside  53 year old female, Romanian speaking, former smoker, with a past medical history of hypertension, hyperlipidemia, PE (2016, on anticoagulation for 6 months), ESRD on dialysis (Tues, Thurs, Sat.), iron deficient anemia, consulted by CT Surgery for management of aortic stenosis and an anomalous RCA.     Patient presented to her cardiologist Dr. India Haley complaining of exertional dyspnea and angina with moderate activity (beginning since April). She reports that she gets winded after walking up 1 flight of stairs and 1-2 city blocks. She also endorses bilateral lower extremity edema. Patient denies any fever, chills, fatigue, syncope, acute chest pain, palpitations, SOB, orthopnea, paroxysmal nocturnal dyspnea, vomiting, abdominal pain, or back pain.    Due to patient's symptoms, she underwent the tests below:    Cardiac catheterization on 18 revealed: Nonobstructive CAD. 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     Echocardiogram 18 revealed: LVEF 55-60%. Aortic  valve is trileaflet and is moderately thickened. Moderate  aortic regurgitation. Severe  aortic stenosis with a peak gradient of 77.43mmhg and mean gradient of 49.28mmHg.     Patient lives with son. She remains independent with all her ADLs. She admits to a mild activity level due to her symptoms. She presents today for elective surgery.          HEALTH ISSUES - PROBLEM Dx:  Renal bone disease: Renal bone disease  Anemia: Anemia  Essential hypertension: Essential hypertension  ESRD (end stage renal disease): ESRD (end stage renal disease)      FAMILY HISTORY:  Family history of diabetes mellitus (Mother)  PAST MEDICAL & SURGICAL HISTORY:  Pulmonary embolism  ESRD (end stage renal disease)  Hyperlipemia  Hemodialysis access, AV graft  Kidney failure  Diabetes  Essential hypertension  H/O  section    Patient is a 53y old  Female who presents with a chief complaint of Anomalous RCA, AS (17 Dec 2018 21:53)      14 system review was unremarkable  acute changes include acute respiratory failure  Vital signs, hemodynamic and respiratory parameters were reviewed from the bedside nursing flowsheet.  ICU Vital Signs Last 24 Hrs  T(C): 35.6 (17 Dec 2018 18:45), Max: 35.6 (17 Dec 2018 18:45)  T(F): 96 (17 Dec 2018 18:45), Max: 96 (17 Dec 2018 18:45)  HR: 89 (17 Dec 2018 21:37) (66 - 90)  BP: --  BP(mean): --  ABP: 102/50 (17 Dec 2018 21:30) (98/50 - 162/62)  ABP(mean): 66 (17 Dec 2018 21:30) (66 - 84)  RR: 18 (17 Dec 2018 21:34) (16 - 18)  SpO2: 100% (17 Dec 2018 21:37) (100% - 100%)    Adult Advanced Hemodynamics Last 24 Hrs  CVP(mm Hg): --  CVP(cm H2O): --  CO: --  CI: --  PA: --  PA(mean): --  PCWP: --  SVR: --  SVRI: --  PVR: --  PVRI: --, ABG - ( 17 Dec 2018 19:40 )  pH, Arterial: 7.36  pH, Blood: x     /  pCO2: 44    /  pO2: 147   / HCO3: 24    / Base Excess: -1.4  /  SaO2: 98                Mode: AC/ CMV (Assist Control/ Continuous Mandatory Ventilation)  RR (machine): 16  TV (machine): 500  FiO2: 60  PEEP: 5  ITime: 1  MAP: 10  PIP: 25    Intake and output was reviewed and the fluid balance was calculated  Daily     Daily   I&O's Summary    17 Dec 2018 07:01  -  17 Dec 2018 21:54  --------------------------------------------------------  IN: 144.1 mL / OUT: 140 mL / NET: 4.1 mL        All lines and drain sites were assessed  Glycemic trend was reviewed CAPILLARY BLOOD GLUCOSE          POCT Blood Glucose.: 225 mg/dL (17 Dec 2018 20:59)    NEURO: No acute change in mental status.  CVS: Crisp  S1, S2, No S3.  RESPIRATORY: Auscultation of the chest reveals equal breath sounds.  GI: Abdomen is soft  Extremities are warm and well perfused.  VASCULAR: + Distal pulses.  Wounds appear clean and unremarkable  Antibiotics are perioperative cefazolin.    labs  CBC Full  -  ( 17 Dec 2018 19:02 )  WBC Count : 12.3 K/uL  Hemoglobin : 9.9 g/dL  Hematocrit : 30.0 %  Platelet Count - Automated : 167 K/uL  Mean Cell Volume : 89.0 fL  Mean Cell Hemoglobin : 29.4 pg  Mean Cell Hemoglobin Concentration : 33.0 g/dL  Auto Neutrophil # : x  Auto Lymphocyte # : x  Auto Monocyte # : x  Auto Eosinophil # : x  Auto Basophil # : x  Auto Neutrophil % : 83.4 %  Auto Lymphocyte % : 8.1 %  Auto Monocyte % : 8.2 %  Auto Eosinophil % : 0.2 %  Auto Basophil % : 0.1 %    12    144  |  102  |  49<H>  ----------------------------<  256<H>  3.5   |  19<L>  |  5.03<H>    Ca    10.1      17 Dec 2018 19:02  Phos  3.4     12  Mg     2.5         TPro  6.1  /  Alb  3.3  /  TBili  0.6  /  DBili  x   /  AST  41<H>  /  ALT  19  /  AlkPhos  76  12-    PT/INR - ( 17 Dec 2018 19:02 )   PT: 14.0 sec;   INR: 1.23          PTT - ( 17 Dec 2018 19:02 )  PTT:28.7 sec  The current medications were reviewed   MEDICATIONS  (STANDING):  albumin human 25% IVPB      albumin human 25% IVPB 50 milliLiter(s) IV Intermittent once  albumin human 25% IVPB 50 milliLiter(s) IV Intermittent once  amiodarone Infusion 1 mG/Min (33.333 mL/Hr) IV Continuous <Continuous>  ceFAZolin   IVPB 2000 milliGRAM(s) IV Intermittent every 8 hours  chlorhexidine 0.12% Liquid 5 milliLiter(s) Oral Mucosa every 4 hours  chlorhexidine 2% Cloths 1 Application(s) Topical daily  dextrose 50% Injectable 50 milliLiter(s) IV Push every 15 minutes  dextrose 50% Injectable 25 milliLiter(s) IV Push every 15 minutes  EPINEPHrine    Infusion 0.02 MICROgram(s)/kG/Min (6.195 mL/Hr) IV Continuous <Continuous>  fentaNYL    Injectable 25 MICROGram(s) IV Push once  heparin  Injectable 5000 Unit(s) SubCutaneous every 8 hours  insulin Infusion 1 Unit(s)/Hr (1 mL/Hr) IV Continuous <Continuous>  milrinone Infusion 0.25 MICROgram(s)/kG/Min (6.195 mL/Hr) IV Continuous <Continuous>  norepinephrine Infusion 0.02 MICROgram(s)/kG/Min (3.097 mL/Hr) IV Continuous <Continuous>  pantoprazole  Injectable 40 milliGRAM(s) IV Push daily  sodium chloride 0.9%. 1000 milliLiter(s) (100 mL/Hr) IV Continuous <Continuous>  vasopressin Infusion 0.04 Unit(s)/Min (2.4 mL/Hr) IV Continuous <Continuous>    MEDICATIONS  (PRN):      PROBLEM LIST/ ASSESSMENT:  HEALTH ISSUES - PROBLEM Dx:  Renal bone disease: Renal bone disease  Anemia: Anemia  Essential hypertension: Essential hypertension  ESRD (end stage renal disease): ESRD (end stage renal disease)        Patient is a 53y old  Female who presents with  chief complaint of Anomalous RCA, AS, MR.  Metabolic acidosis.  Stable hemodynamics.  Uncontrolled DM.          My plan includes :  Close hemodynamic, ventilatory and drain monitoring and management.  Hemodialysis tonight.  IV insulin infusion.  IV amiodarone.  D/C levophed and vasopressin.  D/C epinephrine.  Monitor for arrhythmias and monitor parameters for organ perfusion  Monitor neurologic status  Head of the bed should remain elevated to 45 deg .   Chest PT and IS will be encouraged  Monitor adequacy of oxygenation and ventilation and attempt to wean oxygen  Nutritional goals will be met using po eventually , ensure adequate caloric intake and monitor  the same  Stress ulcer and VTE prophylaxis will be achieved    Glycemic control is satisfactory  Electrolytes have been repleted as necessary and wound care has been carried out. Pain control has been achieved.   Aggressive  physical therapy and early mobility and ambulation goals will be met   The family was updated about the course and plan  CRITICAL CARE TIME SPENT in evaluation and management, reassessments, review and interpretation of labs and x-rays, ventilator and hemodynamic management, formulating a plan and coordinating care: ___60____ MIN.  Time does not include procedural time.  CTICU ATTENDING     					      Parker Aguilar MD

## 2018-12-17 NOTE — BRIEF OPERATIVE NOTE - PRE-OP DX
Anomalous coronary artery origin  12/17/2018    Active  Everardo Robbins  Aortic stenosis  12/17/2018    Active  Everardo Robbins Anomalous coronary artery origin  12/17/2018    Active  Everardo Robbins  Aortic stenosis  12/17/2018    Everardo Jnasen  Mitral regurgitation  12/17/2018    Active  Everardo Robbins

## 2018-12-17 NOTE — CONSULT NOTE ADULT - PROBLEM SELECTOR RECOMMENDATION 9
- her schedule is TTS   - last HD done on 12/15   - we will do HD today   - today 2 hours only for clearance - lactic acidosis   - volume status slightly on the wet side - but we will hold UF for today   - electrolytes noted   - please continue to follow up in and outs   - renal diet when she is not NPO

## 2018-12-17 NOTE — BRIEF OPERATIVE NOTE - OPERATION/FINDINGS
Aortic valve: #23mm Tissue valve  Mitral valve annuloplasty: #31mm partial ring  Aortic cross clamp: 139mins

## 2018-12-17 NOTE — CONSULT NOTE ADULT - SUBJECTIVE AND OBJECTIVE BOX
This is 53 year old female with PMH for ESRD - TTS, HTN, HLD, anemia, DM type 2. The patient and underwent aortic valve replacement  and mitral valve - repair. The renal service is called in regard of her ESRD and the need of HD. The patient seen in her room on 9 east in her bed, intubated and sedated. The family at bedside - son is the one giving the consent - Baylee Koroma. The patient been on HD  since , has a left AV fistula, getting HD in [facility], last HD done on Saturday in her unit. The family does not know the cause of ESRD, duration of the treatment, or dry weight.      Patient is a 53y Female admitted for     PAST MEDICAL & SURGICAL HISTORY:  Pulmonary embolism  ESRD (end stage renal disease)  Hyperlipemia  Hemodialysis access, AV graft  Kidney failure  Diabetes  Essential hypertension  H/O  section      MEDICATIONS  (STANDING):  amiodarone Infusion 1 mG/Min (33.333 mL/Hr) IV Continuous <Continuous>  ceFAZolin   IVPB 2000 milliGRAM(s) IV Intermittent every 8 hours  chlorhexidine 0.12% Liquid 5 milliLiter(s) Oral Mucosa every 4 hours  chlorhexidine 2% Cloths 1 Application(s) Topical daily  dextrose 50% Injectable 50 milliLiter(s) IV Push every 15 minutes  dextrose 50% Injectable 25 milliLiter(s) IV Push every 15 minutes  EPINEPHrine    Infusion 0.02 MICROgram(s)/kG/Min (6.195 mL/Hr) IV Continuous <Continuous>  heparin  Injectable 5000 Unit(s) SubCutaneous every 8 hours  insulin Infusion 1 Unit(s)/Hr (1 mL/Hr) IV Continuous <Continuous>  milrinone Infusion 0.25 MICROgram(s)/kG/Min (6.195 mL/Hr) IV Continuous <Continuous>  norepinephrine Infusion 0.02 MICROgram(s)/kG/Min (3.097 mL/Hr) IV Continuous <Continuous>  pantoprazole  Injectable 40 milliGRAM(s) IV Push daily  sodium chloride 0.9%. 1000 milliLiter(s) (100 mL/Hr) IV Continuous <Continuous>  vasopressin Infusion 0.04 Unit(s)/Min (2.4 mL/Hr) IV Continuous <Continuous>    MEDICATIONS  (PRN):      Allergies    No Known Allergies    Intolerances        SOCIAL HISTORY:    FAMILY HISTORY:  Family history of diabetes mellitus (Mother)      T(C): , Max: 35.6 (12-17-18 @ 18:45)  T(F): , Max: 96 (18 @ 18:45)  HR: 78 (18 @ 20:45)  BP: --  BP(mean): --  RR: 16 (18 @ 20:45)  SpO2: 100% (18 @ 20:45)  Wt(kg): --     @ 07:01  -   @ 21:01  --------------------------------------------------------  IN: 87.1 mL / OUT: 0 mL / NET: 87.1 mL      Height (cm): 154.94 ( @ 09:10)  Weight (kg): 82.6 ( @ 09:10)  BMI (kg/m2): 34.4 ( 09:10)  BSA (m2): 1.82 ( 09:10)    Physical exam:   Intubated and sedated   Chest - s/p thoracotomy at midline, multiple drainages placed, chest tubes, normal air entry into the lungs, no wheezing   RRR, normal s1/s2, no murmurs, rubs or gallops   Abdomen - soft, not tender, not distended   Extremities; no edema   HAs an AV fistula      LABS:                        9.9    12.3  )-----------( 167      ( 17 Dec 2018 19:02 )             30.0         144  |  102  |  49<H>  ----------------------------<  256<H>  3.5   |  19<L>  |  5.03<H>    Ca    10.1      17 Dec 2018 19:02  Phos  3.4       Mg     2.5         TPro  6.1  /  Alb  3.3  /  TBili  0.6  /  DBili  x   /  AST  41<H>  /  ALT  19  /  AlkPhos  76        PT/INR - ( 17 Dec 2018 19:02 )   PT: 14.0 sec;   INR: 1.23          PTT - ( 17 Dec 2018 19:02 )  PTT:28.7 sec          RADIOLOGY & ADDITIONAL STUDIES:        < from: Xray Chest PA/Lat Pre Surgical Testing 2 Views (18 @ 13:22) >  INTERPRETATION:  HISTORY: Aortic stenosis; preoperative evaluation    PA and lateral views of the chest demonstrate a borderline heart size.   There is no evidence of pulmonary vascular congestion, focal infiltrate,   mass, pleural fluid, or pneumothorax. No gross focal bony lesions are   evident. There is an exaggerated thoracic kyphosis and partially imaged   convex left lumbar spinal curvature.     IMPRESSION: No evidence of congestive heart failure or pulmonary   infiltrate.      < end of copied text >

## 2018-12-17 NOTE — BRIEF OPERATIVE NOTE - PROCEDURE
<<-----Click on this checkbox to enter Procedure Mitral valve repair  12/17/2018    Active  SSTOREY1  Aortic valve replacement  12/17/2018    Active  SSTOREY1  Unroofing of coronary artery  12/17/2018  Right coronary artery  Active  SSTOREY1

## 2018-12-18 DIAGNOSIS — R57.0 CARDIOGENIC SHOCK: ICD-10-CM

## 2018-12-18 LAB
ALBUMIN SERPL ELPH-MCNC: 3.1 G/DL — LOW (ref 3.3–5)
ALBUMIN SERPL ELPH-MCNC: 3.5 G/DL — SIGNIFICANT CHANGE UP (ref 3.3–5)
ALBUMIN SERPL ELPH-MCNC: 4.1 G/DL — SIGNIFICANT CHANGE UP (ref 3.3–5)
ALP SERPL-CCNC: 65 U/L — SIGNIFICANT CHANGE UP (ref 40–120)
ALP SERPL-CCNC: 69 U/L — SIGNIFICANT CHANGE UP (ref 40–120)
ALP SERPL-CCNC: 70 U/L — SIGNIFICANT CHANGE UP (ref 40–120)
ALT FLD-CCNC: 15 U/L — SIGNIFICANT CHANGE UP (ref 10–45)
ALT FLD-CCNC: 19 U/L — SIGNIFICANT CHANGE UP (ref 10–45)
ALT FLD-CCNC: 8 U/L — LOW (ref 10–45)
ANION GAP SERPL CALC-SCNC: 14 MMOL/L — SIGNIFICANT CHANGE UP (ref 5–17)
ANION GAP SERPL CALC-SCNC: 16 MMOL/L — SIGNIFICANT CHANGE UP (ref 5–17)
ANION GAP SERPL CALC-SCNC: 20 MMOL/L — HIGH (ref 5–17)
ANION GAP SERPL CALC-SCNC: 21 MMOL/L — HIGH (ref 5–17)
APTT BLD: 25.2 SEC — LOW (ref 27.5–36.3)
APTT BLD: 25.8 SEC — LOW (ref 27.5–36.3)
APTT BLD: 26.9 SEC — LOW (ref 27.5–36.3)
APTT BLD: 27.3 SEC — LOW (ref 27.5–36.3)
AST SERPL-CCNC: 60 U/L — HIGH (ref 10–40)
AST SERPL-CCNC: 70 U/L — HIGH (ref 10–40)
AST SERPL-CCNC: 75 U/L — HIGH (ref 10–40)
BASE EXCESS BLDV CALC-SCNC: 3 MMOL/L — SIGNIFICANT CHANGE UP
BILIRUB DIRECT SERPL-MCNC: 0.4 MG/DL — HIGH (ref 0–0.2)
BILIRUB DIRECT SERPL-MCNC: <0.2 MG/DL — SIGNIFICANT CHANGE UP (ref 0–0.2)
BILIRUB INDIRECT FLD-MCNC: 0.9 MG/DL — SIGNIFICANT CHANGE UP (ref 0.2–1)
BILIRUB INDIRECT FLD-MCNC: >0.2 MG/DL — SIGNIFICANT CHANGE UP (ref 0.2–1)
BILIRUB SERPL-MCNC: 0.4 MG/DL — SIGNIFICANT CHANGE UP (ref 0.2–1.2)
BILIRUB SERPL-MCNC: 0.5 MG/DL — SIGNIFICANT CHANGE UP (ref 0.2–1.2)
BILIRUB SERPL-MCNC: 1.3 MG/DL — HIGH (ref 0.2–1.2)
BUN SERPL-MCNC: 29 MG/DL — HIGH (ref 7–23)
BUN SERPL-MCNC: 31 MG/DL — HIGH (ref 7–23)
BUN SERPL-MCNC: 48 MG/DL — HIGH (ref 7–23)
BUN SERPL-MCNC: 50 MG/DL — HIGH (ref 7–23)
CALCIUM SERPL-MCNC: 10.1 MG/DL — SIGNIFICANT CHANGE UP (ref 8.4–10.5)
CALCIUM SERPL-MCNC: 10.2 MG/DL — SIGNIFICANT CHANGE UP (ref 8.4–10.5)
CALCIUM SERPL-MCNC: 10.5 MG/DL — SIGNIFICANT CHANGE UP (ref 8.4–10.5)
CALCIUM SERPL-MCNC: 9.9 MG/DL — SIGNIFICANT CHANGE UP (ref 8.4–10.5)
CHLORIDE SERPL-SCNC: 102 MMOL/L — SIGNIFICANT CHANGE UP (ref 96–108)
CHLORIDE SERPL-SCNC: 106 MMOL/L — SIGNIFICANT CHANGE UP (ref 96–108)
CHLORIDE SERPL-SCNC: 96 MMOL/L — SIGNIFICANT CHANGE UP (ref 96–108)
CHLORIDE SERPL-SCNC: 99 MMOL/L — SIGNIFICANT CHANGE UP (ref 96–108)
CO2 SERPL-SCNC: 23 MMOL/L — SIGNIFICANT CHANGE UP (ref 22–31)
CO2 SERPL-SCNC: 23 MMOL/L — SIGNIFICANT CHANGE UP (ref 22–31)
CO2 SERPL-SCNC: 25 MMOL/L — SIGNIFICANT CHANGE UP (ref 22–31)
CO2 SERPL-SCNC: 28 MMOL/L — SIGNIFICANT CHANGE UP (ref 22–31)
CREAT SERPL-MCNC: 3.33 MG/DL — HIGH (ref 0.5–1.3)
CREAT SERPL-MCNC: 3.66 MG/DL — HIGH (ref 0.5–1.3)
CREAT SERPL-MCNC: 5 MG/DL — HIGH (ref 0.5–1.3)
CREAT SERPL-MCNC: 5.25 MG/DL — HIGH (ref 0.5–1.3)
GAS PNL BLDA: SIGNIFICANT CHANGE UP
GAS PNL BLDV: SIGNIFICANT CHANGE UP
GLUCOSE BLDC GLUCOMTR-MCNC: 108 MG/DL — HIGH (ref 70–99)
GLUCOSE BLDC GLUCOMTR-MCNC: 112 MG/DL — HIGH (ref 70–99)
GLUCOSE BLDC GLUCOMTR-MCNC: 114 MG/DL — HIGH (ref 70–99)
GLUCOSE BLDC GLUCOMTR-MCNC: 116 MG/DL — HIGH (ref 70–99)
GLUCOSE BLDC GLUCOMTR-MCNC: 127 MG/DL — HIGH (ref 70–99)
GLUCOSE BLDC GLUCOMTR-MCNC: 131 MG/DL — HIGH (ref 70–99)
GLUCOSE BLDC GLUCOMTR-MCNC: 135 MG/DL — HIGH (ref 70–99)
GLUCOSE BLDC GLUCOMTR-MCNC: 141 MG/DL — HIGH (ref 70–99)
GLUCOSE BLDC GLUCOMTR-MCNC: 142 MG/DL — HIGH (ref 70–99)
GLUCOSE BLDC GLUCOMTR-MCNC: 152 MG/DL — HIGH (ref 70–99)
GLUCOSE BLDC GLUCOMTR-MCNC: 158 MG/DL — HIGH (ref 70–99)
GLUCOSE BLDC GLUCOMTR-MCNC: 169 MG/DL — HIGH (ref 70–99)
GLUCOSE BLDC GLUCOMTR-MCNC: 188 MG/DL — HIGH (ref 70–99)
GLUCOSE BLDC GLUCOMTR-MCNC: 81 MG/DL — SIGNIFICANT CHANGE UP (ref 70–99)
GLUCOSE BLDC GLUCOMTR-MCNC: 83 MG/DL — SIGNIFICANT CHANGE UP (ref 70–99)
GLUCOSE BLDC GLUCOMTR-MCNC: 94 MG/DL — SIGNIFICANT CHANGE UP (ref 70–99)
GLUCOSE BLDC GLUCOMTR-MCNC: 94 MG/DL — SIGNIFICANT CHANGE UP (ref 70–99)
GLUCOSE BLDC GLUCOMTR-MCNC: 95 MG/DL — SIGNIFICANT CHANGE UP (ref 70–99)
GLUCOSE BLDC GLUCOMTR-MCNC: 99 MG/DL — SIGNIFICANT CHANGE UP (ref 70–99)
GLUCOSE SERPL-MCNC: 108 MG/DL — HIGH (ref 70–99)
GLUCOSE SERPL-MCNC: 118 MG/DL — HIGH (ref 70–99)
GLUCOSE SERPL-MCNC: 143 MG/DL — HIGH (ref 70–99)
GLUCOSE SERPL-MCNC: 159 MG/DL — HIGH (ref 70–99)
HCO3 BLDV-SCNC: 28 MMOL/L — HIGH (ref 20–27)
HCT VFR BLD CALC: 28.4 % — LOW (ref 34.5–45)
HCT VFR BLD CALC: 28.9 % — LOW (ref 34.5–45)
HCT VFR BLD CALC: 28.9 % — LOW (ref 34.5–45)
HCT VFR BLD CALC: 30.4 % — LOW (ref 34.5–45)
HGB BLD-MCNC: 10.2 G/DL — LOW (ref 11.5–15.5)
HGB BLD-MCNC: 9.6 G/DL — LOW (ref 11.5–15.5)
HGB BLD-MCNC: 9.7 G/DL — LOW (ref 11.5–15.5)
HGB BLD-MCNC: 9.8 G/DL — LOW (ref 11.5–15.5)
INR BLD: 1.15 — SIGNIFICANT CHANGE UP (ref 0.88–1.16)
INR BLD: 1.18 — HIGH (ref 0.88–1.16)
INR BLD: 1.27 — HIGH (ref 0.88–1.16)
INR BLD: 1.28 — HIGH (ref 0.88–1.16)
LACTATE SERPL-SCNC: 1.2 MMOL/L — SIGNIFICANT CHANGE UP (ref 0.5–2)
LACTATE SERPL-SCNC: 2.1 MMOL/L — HIGH (ref 0.5–2)
LACTATE SERPL-SCNC: 2.1 MMOL/L — HIGH (ref 0.5–2)
LACTATE SERPL-SCNC: 2.6 MMOL/L — HIGH (ref 0.5–2)
LACTATE SERPL-SCNC: 4.1 MMOL/L — CRITICAL HIGH (ref 0.5–2)
MAGNESIUM SERPL-MCNC: 2.1 MG/DL — SIGNIFICANT CHANGE UP (ref 1.6–2.6)
MAGNESIUM SERPL-MCNC: 2.3 MG/DL — SIGNIFICANT CHANGE UP (ref 1.6–2.6)
MCHC RBC-ENTMCNC: 28.3 PG — SIGNIFICANT CHANGE UP (ref 27–34)
MCHC RBC-ENTMCNC: 28.7 PG — SIGNIFICANT CHANGE UP (ref 27–34)
MCHC RBC-ENTMCNC: 29 PG — SIGNIFICANT CHANGE UP (ref 27–34)
MCHC RBC-ENTMCNC: 29.2 PG — SIGNIFICANT CHANGE UP (ref 27–34)
MCHC RBC-ENTMCNC: 33.6 G/DL — SIGNIFICANT CHANGE UP (ref 32–36)
MCHC RBC-ENTMCNC: 33.6 G/DL — SIGNIFICANT CHANGE UP (ref 32–36)
MCHC RBC-ENTMCNC: 33.8 G/DL — SIGNIFICANT CHANGE UP (ref 32–36)
MCHC RBC-ENTMCNC: 33.9 G/DL — SIGNIFICANT CHANGE UP (ref 32–36)
MCV RBC AUTO: 83.5 FL — SIGNIFICANT CHANGE UP (ref 80–100)
MCV RBC AUTO: 85.6 FL — SIGNIFICANT CHANGE UP (ref 80–100)
MCV RBC AUTO: 86.3 FL — SIGNIFICANT CHANGE UP (ref 80–100)
MCV RBC AUTO: 86.5 FL — SIGNIFICANT CHANGE UP (ref 80–100)
PCO2 BLDV: 43 MMHG — SIGNIFICANT CHANGE UP (ref 41–51)
PH BLDV: 7.43 — SIGNIFICANT CHANGE UP (ref 7.32–7.43)
PHOSPHATE SERPL-MCNC: 2.5 MG/DL — SIGNIFICANT CHANGE UP (ref 2.5–4.5)
PHOSPHATE SERPL-MCNC: 4 MG/DL — SIGNIFICANT CHANGE UP (ref 2.5–4.5)
PLATELET # BLD AUTO: 105 K/UL — LOW (ref 150–400)
PLATELET # BLD AUTO: 118 K/UL — LOW (ref 150–400)
PLATELET # BLD AUTO: 51 K/UL — LOW (ref 150–400)
PLATELET # BLD AUTO: 80 K/UL — LOW (ref 150–400)
PO2 BLDV: 61 MMHG — SIGNIFICANT CHANGE UP
POTASSIUM SERPL-MCNC: 4 MMOL/L — SIGNIFICANT CHANGE UP (ref 3.5–5.3)
POTASSIUM SERPL-MCNC: 4.1 MMOL/L — SIGNIFICANT CHANGE UP (ref 3.5–5.3)
POTASSIUM SERPL-MCNC: 4.5 MMOL/L — SIGNIFICANT CHANGE UP (ref 3.5–5.3)
POTASSIUM SERPL-MCNC: 5.2 MMOL/L — SIGNIFICANT CHANGE UP (ref 3.5–5.3)
POTASSIUM SERPL-SCNC: 4 MMOL/L — SIGNIFICANT CHANGE UP (ref 3.5–5.3)
POTASSIUM SERPL-SCNC: 4.1 MMOL/L — SIGNIFICANT CHANGE UP (ref 3.5–5.3)
POTASSIUM SERPL-SCNC: 4.5 MMOL/L — SIGNIFICANT CHANGE UP (ref 3.5–5.3)
POTASSIUM SERPL-SCNC: 5.2 MMOL/L — SIGNIFICANT CHANGE UP (ref 3.5–5.3)
PROT SERPL-MCNC: 5.8 G/DL — LOW (ref 6–8.3)
PROT SERPL-MCNC: 6.3 G/DL — SIGNIFICANT CHANGE UP (ref 6–8.3)
PROT SERPL-MCNC: 6.5 G/DL — SIGNIFICANT CHANGE UP (ref 6–8.3)
PROTHROM AB SERPL-ACNC: 13 SEC — HIGH (ref 10–12.9)
PROTHROM AB SERPL-ACNC: 13.4 SEC — HIGH (ref 10–12.9)
PROTHROM AB SERPL-ACNC: 14.5 SEC — HIGH (ref 10–12.9)
PROTHROM AB SERPL-ACNC: 14.6 SEC — HIGH (ref 10–12.9)
RBC # BLD: 3.29 M/UL — LOW (ref 3.8–5.2)
RBC # BLD: 3.34 M/UL — LOW (ref 3.8–5.2)
RBC # BLD: 3.46 M/UL — LOW (ref 3.8–5.2)
RBC # BLD: 3.55 M/UL — LOW (ref 3.8–5.2)
RBC # FLD: 14.4 % — SIGNIFICANT CHANGE UP (ref 10.3–16.9)
RBC # FLD: 14.9 % — SIGNIFICANT CHANGE UP (ref 10.3–16.9)
RBC # FLD: 15.2 % — SIGNIFICANT CHANGE UP (ref 10.3–16.9)
RBC # FLD: 15.3 % — SIGNIFICANT CHANGE UP (ref 10.3–16.9)
SAO2 % BLDV: 92 % — SIGNIFICANT CHANGE UP
SODIUM SERPL-SCNC: 141 MMOL/L — SIGNIFICANT CHANGE UP (ref 135–145)
SODIUM SERPL-SCNC: 143 MMOL/L — SIGNIFICANT CHANGE UP (ref 135–145)
SODIUM SERPL-SCNC: 143 MMOL/L — SIGNIFICANT CHANGE UP (ref 135–145)
SODIUM SERPL-SCNC: 146 MMOL/L — HIGH (ref 135–145)
WBC # BLD: 7.1 K/UL — SIGNIFICANT CHANGE UP (ref 3.8–10.5)
WBC # BLD: 7.5 K/UL — SIGNIFICANT CHANGE UP (ref 3.8–10.5)
WBC # BLD: 8.7 K/UL — SIGNIFICANT CHANGE UP (ref 3.8–10.5)
WBC # BLD: 9.8 K/UL — SIGNIFICANT CHANGE UP (ref 3.8–10.5)
WBC # FLD AUTO: 7.1 K/UL — SIGNIFICANT CHANGE UP (ref 3.8–10.5)
WBC # FLD AUTO: 7.5 K/UL — SIGNIFICANT CHANGE UP (ref 3.8–10.5)
WBC # FLD AUTO: 8.7 K/UL — SIGNIFICANT CHANGE UP (ref 3.8–10.5)
WBC # FLD AUTO: 9.8 K/UL — SIGNIFICANT CHANGE UP (ref 3.8–10.5)

## 2018-12-18 PROCEDURE — 99291 CRITICAL CARE FIRST HOUR: CPT | Mod: 25

## 2018-12-18 PROCEDURE — 99292 CRITICAL CARE ADDL 30 MIN: CPT | Mod: 25

## 2018-12-18 PROCEDURE — 93306 TTE W/DOPPLER COMPLETE: CPT | Mod: 26

## 2018-12-18 PROCEDURE — 71045 X-RAY EXAM CHEST 1 VIEW: CPT | Mod: 26,76

## 2018-12-18 PROCEDURE — 31622 DX BRONCHOSCOPE/WASH: CPT

## 2018-12-18 PROCEDURE — 99292 CRITICAL CARE ADDL 30 MIN: CPT

## 2018-12-18 RX ORDER — DEXMEDETOMIDINE HYDROCHLORIDE IN 0.9% SODIUM CHLORIDE 4 UG/ML
0.5 INJECTION INTRAVENOUS
Qty: 200 | Refills: 0 | Status: DISCONTINUED | OUTPATIENT
Start: 2018-12-18 | End: 2018-12-19

## 2018-12-18 RX ORDER — ALBUMIN HUMAN 25 %
250 VIAL (ML) INTRAVENOUS ONCE
Qty: 0 | Refills: 0 | Status: COMPLETED | OUTPATIENT
Start: 2018-12-18 | End: 2018-12-18

## 2018-12-18 RX ORDER — FENTANYL CITRATE 50 UG/ML
25 INJECTION INTRAVENOUS ONCE
Qty: 0 | Refills: 0 | Status: DISCONTINUED | OUTPATIENT
Start: 2018-12-18 | End: 2018-12-18

## 2018-12-18 RX ORDER — VASOPRESSIN 20 [USP'U]/ML
0.03 INJECTION INTRAVENOUS
Qty: 100 | Refills: 0 | Status: DISCONTINUED | OUTPATIENT
Start: 2018-12-18 | End: 2018-12-20

## 2018-12-18 RX ORDER — ASPIRIN/CALCIUM CARB/MAGNESIUM 324 MG
81 TABLET ORAL DAILY
Qty: 0 | Refills: 0 | Status: DISCONTINUED | OUTPATIENT
Start: 2018-12-18 | End: 2018-12-19

## 2018-12-18 RX ORDER — HYDROMORPHONE HYDROCHLORIDE 2 MG/ML
0.5 INJECTION INTRAMUSCULAR; INTRAVENOUS; SUBCUTANEOUS ONCE
Qty: 0 | Refills: 0 | Status: DISCONTINUED | OUTPATIENT
Start: 2018-12-18 | End: 2018-12-18

## 2018-12-18 RX ORDER — BUDESONIDE, MICRONIZED 100 %
0.5 POWDER (GRAM) MISCELLANEOUS EVERY 12 HOURS
Qty: 0 | Refills: 0 | Status: DISCONTINUED | OUTPATIENT
Start: 2018-12-18 | End: 2018-12-20

## 2018-12-18 RX ORDER — ALBUMIN HUMAN 25 %
50 VIAL (ML) INTRAVENOUS
Qty: 0 | Refills: 0 | Status: DISCONTINUED | OUTPATIENT
Start: 2018-12-18 | End: 2018-12-19

## 2018-12-18 RX ORDER — ALBUMIN HUMAN 25 %
50 VIAL (ML) INTRAVENOUS ONCE
Qty: 0 | Refills: 0 | Status: DISCONTINUED | OUTPATIENT
Start: 2018-12-18 | End: 2018-12-19

## 2018-12-18 RX ORDER — CALCIUM GLUCONATE 100 MG/ML
2 VIAL (ML) INTRAVENOUS ONCE
Qty: 0 | Refills: 0 | Status: COMPLETED | OUTPATIENT
Start: 2018-12-18 | End: 2018-12-18

## 2018-12-18 RX ORDER — CEFAZOLIN SODIUM 1 G
2000 VIAL (EA) INJECTION ONCE
Qty: 0 | Refills: 0 | Status: COMPLETED | OUTPATIENT
Start: 2018-12-18 | End: 2018-12-18

## 2018-12-18 RX ORDER — ONDANSETRON 8 MG/1
4 TABLET, FILM COATED ORAL ONCE
Qty: 0 | Refills: 0 | Status: COMPLETED | OUTPATIENT
Start: 2018-12-18 | End: 2018-12-18

## 2018-12-18 RX ORDER — ACETAMINOPHEN 500 MG
1000 TABLET ORAL ONCE
Qty: 0 | Refills: 0 | Status: DISCONTINUED | OUTPATIENT
Start: 2018-12-18 | End: 2018-12-18

## 2018-12-18 RX ORDER — ACETAMINOPHEN 500 MG
1000 TABLET ORAL ONCE
Qty: 0 | Refills: 0 | Status: COMPLETED | OUTPATIENT
Start: 2018-12-18 | End: 2018-12-18

## 2018-12-18 RX ORDER — ALBUMIN HUMAN 25 %
50 VIAL (ML) INTRAVENOUS ONCE
Qty: 0 | Refills: 0 | Status: COMPLETED | OUTPATIENT
Start: 2018-12-18 | End: 2018-12-18

## 2018-12-18 RX ORDER — PROPOFOL 10 MG/ML
15 INJECTION, EMULSION INTRAVENOUS
Qty: 1000 | Refills: 0 | Status: DISCONTINUED | OUTPATIENT
Start: 2018-12-18 | End: 2018-12-19

## 2018-12-18 RX ADMIN — CHLORHEXIDINE GLUCONATE 5 MILLILITER(S): 213 SOLUTION TOPICAL at 18:51

## 2018-12-18 RX ADMIN — Medication 100 MILLIGRAM(S): at 06:44

## 2018-12-18 RX ADMIN — CHLORHEXIDINE GLUCONATE 5 MILLILITER(S): 213 SOLUTION TOPICAL at 09:22

## 2018-12-18 RX ADMIN — FENTANYL CITRATE 25 MICROGRAM(S): 50 INJECTION INTRAVENOUS at 17:40

## 2018-12-18 RX ADMIN — CHLORHEXIDINE GLUCONATE 5 MILLILITER(S): 213 SOLUTION TOPICAL at 14:24

## 2018-12-18 RX ADMIN — Medication 125 MILLILITER(S): at 08:00

## 2018-12-18 RX ADMIN — Medication 50 MILLILITER(S): at 09:48

## 2018-12-18 RX ADMIN — Medication 50 MILLILITER(S): at 10:20

## 2018-12-18 RX ADMIN — Medication 50 MILLILITER(S): at 09:22

## 2018-12-18 RX ADMIN — MILRINONE LACTATE 6.2 MICROGRAM(S)/KG/MIN: 1 INJECTION, SOLUTION INTRAVENOUS at 01:39

## 2018-12-18 RX ADMIN — MILRINONE LACTATE 6.2 MICROGRAM(S)/KG/MIN: 1 INJECTION, SOLUTION INTRAVENOUS at 14:30

## 2018-12-18 RX ADMIN — INSULIN HUMAN 1 UNIT(S)/HR: 100 INJECTION, SOLUTION SUBCUTANEOUS at 00:00

## 2018-12-18 RX ADMIN — Medication 200 GRAM(S): at 09:48

## 2018-12-18 RX ADMIN — PROPOFOL 7.43 MICROGRAM(S)/KG/MIN: 10 INJECTION, EMULSION INTRAVENOUS at 05:10

## 2018-12-18 RX ADMIN — HYDROMORPHONE HYDROCHLORIDE 0.5 MILLIGRAM(S): 2 INJECTION INTRAMUSCULAR; INTRAVENOUS; SUBCUTANEOUS at 22:05

## 2018-12-18 RX ADMIN — HYDROMORPHONE HYDROCHLORIDE 0.5 MILLIGRAM(S): 2 INJECTION INTRAMUSCULAR; INTRAVENOUS; SUBCUTANEOUS at 21:45

## 2018-12-18 RX ADMIN — CHLORHEXIDINE GLUCONATE 5 MILLILITER(S): 213 SOLUTION TOPICAL at 06:44

## 2018-12-18 RX ADMIN — AMIODARONE HYDROCHLORIDE 33.33 MG/MIN: 400 TABLET ORAL at 01:41

## 2018-12-18 RX ADMIN — PANTOPRAZOLE SODIUM 40 MILLIGRAM(S): 20 TABLET, DELAYED RELEASE ORAL at 11:46

## 2018-12-18 RX ADMIN — ONDANSETRON 4 MILLIGRAM(S): 8 TABLET, FILM COATED ORAL at 17:29

## 2018-12-18 RX ADMIN — Medication 400 MILLIGRAM(S): at 19:39

## 2018-12-18 RX ADMIN — PROPOFOL 7.43 MICROGRAM(S)/KG/MIN: 10 INJECTION, EMULSION INTRAVENOUS at 14:30

## 2018-12-18 RX ADMIN — HEPARIN SODIUM 5000 UNIT(S): 5000 INJECTION INTRAVENOUS; SUBCUTANEOUS at 14:25

## 2018-12-18 RX ADMIN — FENTANYL CITRATE 25 MICROGRAM(S): 50 INJECTION INTRAVENOUS at 09:35

## 2018-12-18 RX ADMIN — Medication 100 MILLIGRAM(S): at 17:43

## 2018-12-18 RX ADMIN — Medication 1000 MILLIGRAM(S): at 20:00

## 2018-12-18 RX ADMIN — Medication 50 MILLILITER(S): at 11:23

## 2018-12-18 RX ADMIN — VASOPRESSIN 2.4 UNIT(S)/MIN: 20 INJECTION INTRAVENOUS at 14:51

## 2018-12-18 RX ADMIN — CHLORHEXIDINE GLUCONATE 5 MILLILITER(S): 213 SOLUTION TOPICAL at 21:53

## 2018-12-18 RX ADMIN — CHLORHEXIDINE GLUCONATE 5 MILLILITER(S): 213 SOLUTION TOPICAL at 06:11

## 2018-12-18 RX ADMIN — HEPARIN SODIUM 5000 UNIT(S): 5000 INJECTION INTRAVENOUS; SUBCUTANEOUS at 06:44

## 2018-12-18 RX ADMIN — FENTANYL CITRATE 25 MICROGRAM(S): 50 INJECTION INTRAVENOUS at 17:20

## 2018-12-18 RX ADMIN — HEPARIN SODIUM 5000 UNIT(S): 5000 INJECTION INTRAVENOUS; SUBCUTANEOUS at 21:53

## 2018-12-18 RX ADMIN — CHLORHEXIDINE GLUCONATE 1 APPLICATION(S): 213 SOLUTION TOPICAL at 06:44

## 2018-12-18 RX ADMIN — FENTANYL CITRATE 25 MICROGRAM(S): 50 INJECTION INTRAVENOUS at 09:21

## 2018-12-18 RX ADMIN — POTASSIUM PHOSPHATE, MONOBASIC POTASSIUM PHOSPHATE, DIBASIC 62.5 MILLIMOLE(S): 236; 224 INJECTION, SOLUTION INTRAVENOUS at 01:38

## 2018-12-18 RX ADMIN — Medication 81 MILLIGRAM(S): at 21:53

## 2018-12-18 RX ADMIN — Medication 0.5 MILLIGRAM(S): at 18:56

## 2018-12-18 RX ADMIN — Medication 125 MILLILITER(S): at 08:30

## 2018-12-18 NOTE — PROGRESS NOTE ADULT - SUBJECTIVE AND OBJECTIVE BOX
Patient was seen and evaluated on dialysis.   Patient is tolerating the procedure well.   HR: 68 (12-18-18 @ 11:00)  BP: 135/65 (12-17-18 @ 23:00)  Continue dialysis:   Dialyzer:  Optiflux 180        QB: 300       QD: 500 2K+  Goal UF 0.5 to 1 kg over 3 Hours

## 2018-12-18 NOTE — PROGRESS NOTE ADULT - PROBLEM SELECTOR PLAN 3
calcium and phosphorus noted  - please check the PTH, Vitamin D 25, VItamin D 1,25 level.  - no need for binders at present.

## 2018-12-18 NOTE — PROGRESS NOTE ADULT - SUBJECTIVE AND OBJECTIVE BOX
CTICU  CRITICAL  CARE  attending     Hand off received 					   Pertinent clinical, laboratory, radiographic, hemodynamic, echocardiographic, respiratory data, microbiologic data and chart were reviewed and analyzed frequently throughout the course of the day and night  Patient seen and examined with CTS/ SH attending at bedside  Pt is a 53y , Female, HEALTH ISSUES - PROBLEM Dx:  Cardiogenic shock: Cardiogenic shock  Renal bone disease: Renal bone disease  Anemia: Anemia  Essential hypertension: Essential hypertension  ESRD (end stage renal disease): ESRD (end stage renal disease)      , FAMILY HISTORY:  Family history of diabetes mellitus (Mother)  PAST MEDICAL & SURGICAL HISTORY:  Pulmonary embolism  ESRD (end stage renal disease)  Hyperlipemia  Hemodialysis access, AV graft  Kidney failure  Diabetes  Essential hypertension  H/O  section    Patient is a 53y old  Female who presents with a chief complaint of Anomalous RCA, AS (18 Dec 2018 11:46)      14 system review was unremarkable  acute changes include acute respiratory failure  Vital signs, hemodynamic and respiratory parameters were reviewed from the bedside nursing flowsheet.  ICU Vital Signs Last 24 Hrs  T(C): 36.1 (18 Dec 2018 21:13), Max: 36.6 (17 Dec 2018 21:50)  T(F): 96.9 (18 Dec 2018 21:13), Max: 97.8 (17 Dec 2018 21:50)  HR: 88 (18 Dec 2018 21:00) (50 - 90)  BP: 135/65 (17 Dec 2018 23:00) (135/65 - 135/65)  BP(mean): 78 (17 Dec 2018 23:00) (78 - 78)  ABP: 108/44 (18 Dec 2018 21:00) (82/46 - 178/74)  ABP(mean): 60 (18 Dec 2018 21:00) (58 - 106)  RR: 22 (18 Dec 2018 21:00) (5 - 25)  SpO2: 98% (18 Dec 2018 21:00) (93% - 100%)    Adult Advanced Hemodynamics Last 24 Hrs  CVP(mm Hg): --  CVP(cm H2O): --  CO: --  CI: --  PA: --  PA(mean): --  PCWP: --  SVR: --  SVRI: --  PVR: --  PVRI: --, ABG - ( 18 Dec 2018 19:23 )  pH, Arterial: 7.46  pH, Blood: x     /  pCO2: 39    /  pO2: 196   / HCO3: 27    / Base Excess: 2.8   /  SaO2: 99                Mode: AC/ CMV (Assist Control/ Continuous Mandatory Ventilation)  RR (machine): 10  TV (machine): 600  FiO2: 50  PEEP: 5  ITime: 1  MAP: 9  PIP: 31    Intake and output was reviewed and the fluid balance was calculated  Daily     Daily Weight in k.8 (17 Dec 2018 21:50)  I&O's Summary    17 Dec 2018 07:  -  18 Dec 2018 07:00  --------------------------------------------------------  IN: 1224.9 mL / OUT: 400 mL / NET: 824.9 mL    18 Dec 2018 07:01  -  18 Dec 2018 21:15  --------------------------------------------------------  IN: 3090 mL / OUT: 2175 mL / NET: 915 mL        All lines and drain sites were assessed  [facility]      POCT Blood Glucose.: 131 mg/dL (18 Dec 2018 19:45)    No acute change in mental status  Auscultation of the chest reveals equal bs  Abdomen is soft  Extremities are warm and well perfused  Wounds appear clean and unremarkable  Antibiotics are periop    labs  CBC Full  -  ( 18 Dec 2018 18:18 )  WBC Count : 7.5 K/uL  Hemoglobin : 9.6 g/dL  Hematocrit : 28.4 %  Platelet Count - Automated : 80 K/uL  Mean Cell Volume : 86.3 fL  Mean Cell Hemoglobin : 29.2 pg  Mean Cell Hemoglobin Concentration : 33.8 g/dL  Auto Neutrophil # : x  Auto Lymphocyte # : x  Auto Monocyte # : x  Auto Eosinophil # : x  Auto Basophil # : x  Auto Neutrophil % : x  Auto Lymphocyte % : x  Auto Monocyte % : x  Auto Eosinophil % : x  Auto Basophil % : x    18    141  |  96  |  31<H>  ----------------------------<  143<H>  4.5   |  25  |  3.66<H>    Ca    9.9      18 Dec 2018 18:18  Phos  4.0     12-18  Mg     2.1     12-18    TPro  6.3  /  Alb  4.1  /  TBili  1.3<H>  /  DBili  0.4<H>  /  AST  60<H>  /  ALT  8<L>  /  AlkPhos  65  12-18    PT/INR - ( 18 Dec 2018 18:18 )   PT: 14.5 sec;   INR: 1.27          PTT - ( 18 Dec 2018 18:18 )  PTT:27.3 sec  The current medications were reviewed   MEDICATIONS  (STANDING):  acetaminophen  IVPB .. 1000 milliGRAM(s) IV Intermittent once  albumin human 25% IVPB      albumin human 25% IVPB 50 milliLiter(s) IV Intermittent once  albumin human 25% IVPB 50 milliLiter(s) IV Intermittent every 1 hour  albumin human 25% IVPB 50 milliLiter(s) IV Intermittent once  aspirin enteric coated 81 milliGRAM(s) Oral daily  buDESOnide   0.5 milliGRAM(s) Respule 0.5 milliGRAM(s) Inhalation every 12 hours  chlorhexidine 0.12% Liquid 5 milliLiter(s) Oral Mucosa every 4 hours  chlorhexidine 2% Cloths 1 Application(s) Topical daily  dexmedetomidine Infusion 0.5 MICROgram(s)/kG/Hr (10.325 mL/Hr) IV Continuous <Continuous>  dextrose 50% Injectable 50 milliLiter(s) IV Push every 15 minutes  dextrose 50% Injectable 25 milliLiter(s) IV Push every 15 minutes  heparin  Injectable 5000 Unit(s) SubCutaneous every 8 hours  insulin Infusion 1 Unit(s)/Hr (1 mL/Hr) IV Continuous <Continuous>  milrinone Infusion 0.25 MICROgram(s)/kG/Min (6.195 mL/Hr) IV Continuous <Continuous>  norepinephrine Infusion 0.02 MICROgram(s)/kG/Min (3.097 mL/Hr) IV Continuous <Continuous>  pantoprazole  Injectable 40 milliGRAM(s) IV Push daily  propofol Infusion 15 MICROgram(s)/kG/Min (7.434 mL/Hr) IV Continuous <Continuous>  sodium chloride 0.9%. 1000 milliLiter(s) (100 mL/Hr) IV Continuous <Continuous>    MEDICATIONS  (PRN):       PROBLEM LIST/ ASSESSMENT:  HEALTH ISSUES - PROBLEM Dx:  Cardiogenic shock: Cardiogenic shock  Renal bone disease: Renal bone disease  Anemia: Anemia  Essential hypertension: Essential hypertension  ESRD (end stage renal disease): ESRD (end stage renal disease)      ,   Patient is a 53y old  Female who presents with a chief complaint of Anomalous RCA, AS (18 Dec 2018 11:46)     s/p cardiac surgery      My plan includes :  close hemodynamic, ventilatory and drain monitoring and management per post op routine    Monitor for arrhythmias and monitor parameters for organ perfusion  monitor neurologic status  Head of the bed should remain elevated to 45 deg .   chest PT and IS will be encouraged  monitor adequacy of oxygenation and ventilation and attempt to wean oxygen  Nutritional goals will be met using po eventually , ensure adequate caloric intake and montior the same  Stress ulcer and VTE prophylaxis will be achieved    Glycemic control is satisfactory  Electrolytes have been repleted as necessary and wound care has been carried out. Pain control has been achieved.   agressive physical therapy and early mobility and ambulation goals will be met   The family was updated about the course and plan  CRITICAL CARE TIME SPENT in evaluation and management, reassessments, review and interpretation of labs and x-rays, ventilator and hemodynamic management, formulating a plan and coordinating care: ___90____ MIN.  Time does not include procedural time.  CTICU ATTENDING     					    Dontae Muhammad MD

## 2018-12-18 NOTE — PROGRESS NOTE ADULT - PROBLEM SELECTOR PLAN 1
ESRD on HD via LUE AVF with TTS schedule  Last HD on 12/17 post operatively unable to tolerated HD  Will schedule for HD today as tolerated for clearances and UF with low pressors as tolerated.  If continued hypotensive/cardiogenic shock will consider CVVHD if unable to tolerated IHD.

## 2018-12-18 NOTE — PROGRESS NOTE ADULT - SUBJECTIVE AND OBJECTIVE BOX
Patient is a 53y Female seen and evaluated at bedside. Patient remains critically ill sedated and intubated on ventilatory support and IV pressors at present. Last HD on 12/17 post op unable to tolerated HD. Last 24 hours I/o with net positive 850 cc.     albumin human  5% IVPB 250 once  albumin human  5% IVPB 250 once  albumin human 25% IVPB    albumin human 25% IVPB 50 once  albumin human 25% IVPB 50 once  albumin human 25% IVPB 50 every 1 hour  aspirin enteric coated 81 daily  ceFAZolin   IVPB 2000 once  chlorhexidine 0.12% Liquid 5 every 4 hours  chlorhexidine 2% Cloths 1 daily  dextrose 50% Injectable 50 every 15 minutes  dextrose 50% Injectable 25 every 15 minutes  heparin  Injectable 5000 every 8 hours  insulin Infusion 1 <Continuous>  milrinone Infusion 0.25 <Continuous>  norepinephrine Infusion 0.02 <Continuous>  pantoprazole  Injectable 40 daily  propofol Infusion 15 <Continuous>  sodium chloride 0.9%. 1000 <Continuous>  vasopressin Infusion 0.04 <Continuous>      Allergies    No Known Allergies    Intolerances        T(C): , Max: 36.6 (12-17-18 @ 21:50)  T(F): , Max: 97.8 (12-17-18 @ 21:50)  HR: 68 (12-18-18 @ 11:00)  BP: 135/65 (12-17-18 @ 23:00)  BP(mean): 78 (12-17-18 @ 23:00)  RR: 15 (12-18-18 @ 11:00)  SpO2: 100% (12-18-18 @ 11:00)  Wt(kg): --    12-17 @ 07:01  -  12-18 @ 07:00  --------------------------------------------------------  IN: 1224.9 mL / OUT: 400 mL / NET: 824.9 mL    12-18 @ 07:01  -  12-18 @ 11:20  --------------------------------------------------------  IN: 1088.9 mL / OUT: 0 mL / NET: 1088.9 mL    ROS:  Limited. Patient sedated and intuabated on ventilatory support.    Physical exam:   Intubated and sedated   Chest - s/p thoracotomy at midline, multiple drainages placed, chest tubes, normal air entry into the lungs, no wheezing   RRR, normal s1/s2, no murmurs, rubs or gallops   Abdomen - soft, not tender, not distended   Extremities; no edema   HAs an AV fistula              ACCESS:     LABS:                        10.2   9.8   )-----------( 118      ( 18 Dec 2018 08:07 )             30.4     12-18    146<H>  |  102  |  50<H>  ----------------------------<  159<H>  5.2   |  23  |  5.25<H>    Ca    10.2      18 Dec 2018 08:07  Phos  2.5     12-18  Mg     2.3     12-18    TPro  6.5  /  Alb  3.5  /  TBili  0.4  /  DBili  <0.2  /  AST  75<H>  /  ALT  15  /  AlkPhos  69  12-18    Hepatitis B Surface Antibody: Reactive (12-17 @ 22:44)  Hepatitis C Virus S/CO Ratio: 0.19 S/CO (12-17 @ 22:44)    PT/INR - ( 18 Dec 2018 08:07 )   PT: 13.0 sec;   INR: 1.15          PTT - ( 18 Dec 2018 08:07 )  PTT:25.8 sec          RADIOLOGY & ADDITIONAL STUDIES:

## 2018-12-18 NOTE — PROGRESS NOTE ADULT - SUBJECTIVE AND OBJECTIVE BOX
CTICU  CRITICAL  CARE  attending     Hand off received 					   Pertinent clinical, laboratory, radiographic, hemodynamic, echocardiographic, respiratory data, microbiologic data and chart were reviewed and analyzed frequently throughout the course of the day and night  Patient seen and examined with CTS/ SH attending at bedside    53 year old female, Polish speaking, former smoker, with a past medical history of hypertension, hyperlipidemia, PE (2016, on anticoagulation for 6 months), ESRD on dialysis (Tues, Thurs, Sat.), iron deficient anemia, consulted by CT Surgery for management of aortic stenosis and an anomalous RCA.   Patient presented to her cardiologist Dr. India Haley complaining of exertional dyspnea and angina with moderate activity (beginning since April). She reports that she gets winded after walking up 1 flight of stairs and 1-2 city blocks. She also endorses bilateral lower extremity edema. Patient denies any fever, chills, fatigue, syncope, acute chest pain, palpitations, SOB, orthopnea, paroxysmal nocturnal dyspnea, vomiting, abdominal pain, or back pain.  Due to patient's symptoms, she underwent the tests below:  Cardiac catheterization on 18 revealed: Nonobstructive CAD. 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   Echocardiogram 18 revealed: LVEF 55-60%. Aortic  valve is trileaflet and is moderately thickened. Moderate  aortic regurgitation. Severe  aortic stenosis with a peak gradient of 77.43mmhg and mean gradient of 49.28mmHg    HEALTH ISSUES - PROBLEM Dx:  Cardiogenic shock: Cardiogenic shock  Renal bone disease: Renal bone disease  Anemia: Anemia  Essential hypertension: Essential hypertension  ESRD (end stage renal disease): ESRD (end stage renal disease)      FAMILY HISTORY:  Family history of diabetes mellitus (Mother)  PAST MEDICAL & SURGICAL HISTORY:  Pulmonary embolism  ESRD (end stage renal disease)  Hyperlipemia  Hemodialysis access, AV graft  Kidney failure  Diabetes  Essential hypertension  H/O  section    Patient is a 53y old  Female who presents with a chief complaint of Anomalous RCA, AS (18 Dec 2018 21:21)      14 system review was unremarkable  acute changes include acute respiratory failure  Vital signs, hemodynamic and respiratory parameters were reviewed from the bedside nursing flowsheet.  ICU Vital Signs Last 24 Hrs  T(C): 36.1 (18 Dec 2018 21:13), Max: 36.6 (17 Dec 2018 21:50)  T(F): 96.9 (18 Dec 2018 21:13), Max: 97.8 (17 Dec 2018 21:50)  HR: 88 (18 Dec 2018 21:00) (50 - 90)  BP: 135/65 (17 Dec 2018 23:00) (135/65 - 135/65)  BP(mean): 78 (17 Dec 2018 23:00) (78 - 78)  ABP: 108/44 (18 Dec 2018 21:00) (82/46 - 178/74)  ABP(mean): 60 (18 Dec 2018 21:00) (58 - 106)  RR: 22 (18 Dec 2018 21:00) (5 - 25)  SpO2: 98% (18 Dec 2018 21:00) (93% - 100%)    Adult Advanced Hemodynamics Last 24 Hrs  CVP(mm Hg): --  CVP(cm H2O): --  CO: --  CI: --  PA: --  PA(mean): --  PCWP: --  SVR: --  SVRI: --  PVR: --  PVRI: --, ABG - ( 18 Dec 2018 19:23 )  pH, Arterial: 7.46  pH, Blood: x     /  pCO2: 39    /  pO2: 196   / HCO3: 27    / Base Excess: 2.8   /  SaO2: 99                Mode: AC/ CMV (Assist Control/ Continuous Mandatory Ventilation)  RR (machine): 10  TV (machine): 600  FiO2: 50  PEEP: 5  ITime: 1  MAP: 9  PIP: 31    Intake and output was reviewed and the fluid balance was calculated  Daily     Daily Weight in k.8 (17 Dec 2018 21:50)  I&O's Summary    17 Dec 2018 07:  -  18 Dec 2018 07:00  --------------------------------------------------------  IN: 1224.9 mL / OUT: 400 mL / NET: 824.9 mL    18 Dec 2018 07:  -  18 Dec 2018 21:30  --------------------------------------------------------  IN: 3090 mL / OUT: 2175 mL / NET: 915 mL        All lines and drain sites were assessed  Glycemic trend was reviewed CAPILLARY BLOOD GLUCOSE      POCT Blood Glucose.: 131 mg/dL (18 Dec 2018 19:45)    NEURO: No acute change in mental status from the baseline.  CVS: Crisp  S1, S2, No S3.  RESPIRATORY: Auscultation of the chest reveals equal breath sounds.  GI: Abdomen is soft  Extremities are warm and well perfused.  VASCULAR: + Distal pulses.  Wounds appear clean and unremarkable  Antibiotics are perioperative cefazolin.          labs  CBC Full  -  ( 18 Dec 2018 18:18 )  WBC Count : 7.5 K/uL  Hemoglobin : 9.6 g/dL  Hematocrit : 28.4 %  Platelet Count - Automated : 80 K/uL  Mean Cell Volume : 86.3 fL  Mean Cell Hemoglobin : 29.2 pg  Mean Cell Hemoglobin Concentration : 33.8 g/dL  Auto Neutrophil # : x  Auto Lymphocyte # : x  Auto Monocyte # : x  Auto Eosinophil # : x  Auto Basophil # : x  Auto Neutrophil % : x  Auto Lymphocyte % : x  Auto Monocyte % : x  Auto Eosinophil % : x  Auto Basophil % : x    1218    141  |  96  |  31<H>  ----------------------------<  143<H>  4.5   |  25  |  3.66<H>    Ca    9.9      18 Dec 2018 18:18  Phos  4.0     1218  Mg     2.1     18    TPro  6.3  /  Alb  4.1  /  TBili  1.3<H>  /  DBili  0.4<H>  /  AST  60<H>  /  ALT  8<L>  /  AlkPhos  65  12-18    PT/INR - ( 18 Dec 2018 18:18 )   PT: 14.5 sec;   INR: 1.27          PTT - ( 18 Dec 2018 18:18 )  PTT:27.3 sec  The current medications were reviewed   MEDICATIONS  (STANDING):  albumin human 25% IVPB      albumin human 25% IVPB 50 milliLiter(s) IV Intermittent once  albumin human 25% IVPB 50 milliLiter(s) IV Intermittent every 1 hour  albumin human 25% IVPB 50 milliLiter(s) IV Intermittent once  aspirin enteric coated 81 milliGRAM(s) Oral daily  buDESOnide   0.5 milliGRAM(s) Respule 0.5 milliGRAM(s) Inhalation every 12 hours  chlorhexidine 0.12% Liquid 5 milliLiter(s) Oral Mucosa every 4 hours  chlorhexidine 2% Cloths 1 Application(s) Topical daily  dexmedetomidine Infusion 0.5 MICROgram(s)/kG/Hr (10.325 mL/Hr) IV Continuous <Continuous>  dextrose 50% Injectable 50 milliLiter(s) IV Push every 15 minutes  dextrose 50% Injectable 25 milliLiter(s) IV Push every 15 minutes  heparin  Injectable 5000 Unit(s) SubCutaneous every 8 hours  HYDROmorphone  Injectable 0.5 milliGRAM(s) IV Push once  insulin Infusion 1 Unit(s)/Hr (1 mL/Hr) IV Continuous <Continuous>  milrinone Infusion 0.25 MICROgram(s)/kG/Min (6.195 mL/Hr) IV Continuous <Continuous>  norepinephrine Infusion 0.02 MICROgram(s)/kG/Min (3.097 mL/Hr) IV Continuous <Continuous>  pantoprazole  Injectable 40 milliGRAM(s) IV Push daily  propofol Infusion 15 MICROgram(s)/kG/Min (7.434 mL/Hr) IV Continuous <Continuous>  sodium chloride 0.9%. 1000 milliLiter(s) (100 mL/Hr) IV Continuous <Continuous>    MEDICATIONS  (PRN):        PROBLEM LIST/ ASSESSMENT:  HEALTH ISSUES - PROBLEM Dx:  Cardiogenic shock: Cardiogenic shock  Renal bone disease: Renal bone disease  Anemia: Anemia  Essential hypertension: Essential hypertension  ESRD (end stage renal disease): ESRD (end stage renal disease)        Patient is a 53y old  Female who presents with  chief complaint of Anomalous RCA, AS, MR.  S/P AVR  S/P MV Repair  S/P Unroofing of RCA.  S/P Extubation.  S/P Hemodialysis.  Hemodynamically stable.  Fair oxygenation.      My plan includes :  D/C milrinone.  Close hemodynamic, ventilatory and drain monitoring and management.  Monitor for arrhythmias and monitor parameters for organ perfusion  Monitor neurologic status  Head of the bed should remain elevated to 45 deg .   Chest PT and IS will be encouraged  Monitor adequacy of oxygenation and ventilation and attempt to wean oxygen  Nutritional goals will be met using po eventually , ensure adequate caloric intake and monitor  the same  Stress ulcer and VTE prophylaxis will be achieved    Glycemic control is satisfactory  Electrolytes have been repleted as necessary and wound care has been carried out. Pain control has been achieved.   Aggressive physical therapy and early mobility and ambulation goals will be met   The family was updated about the course and plan  CRITICAL CARE TIME SPENT in evaluation and management, reassessments, review and interpretation of labs and x-rays, ventilator and hemodynamic management, formulating a plan and coordinating care: ___30____ MIN.  Time does not include procedural time.  CTICU ATTENDING     					    Parker Aguilar MD

## 2018-12-18 NOTE — PROGRESS NOTE ADULT - ASSESSMENT
This is 53 year old female with PMH stated above, now s/p aortic valve replacement and mitral valve repair. During the procedure - 4 units PRBC, 4 FFP, 2 plts, 10 cryo, now with stable vital signs on Milrinone. The case discussed with the attending from ICU and the family - all agreeable to receive HD Today for clerance - mild lactic acidosis from the cardiogenic shock.

## 2018-12-18 NOTE — PROGRESS NOTE ADULT - SUBJECTIVE AND OBJECTIVE BOX
CTICU  CRITICAL  CARE  attending     Hand off received 					   Pertinent clinical, laboratory, radiographic, hemodynamic, echocardiographic, respiratory data, microbiologic data and chart were reviewed and analyzed frequently throughout the course of the day and night  Patient seen and examined with CTS/ SH attending at bedside  Pt is a 53y , Female, HEALTH ISSUES - PROBLEM Dx:  Cardiogenic shock: Cardiogenic shock  Renal bone disease: Renal bone disease  Anemia: Anemia  Essential hypertension: Essential hypertension  ESRD (end stage renal disease): ESRD (end stage renal disease)      , FAMILY HISTORY:  Family history of diabetes mellitus (Mother)  PAST MEDICAL & SURGICAL HISTORY:  Pulmonary embolism  ESRD (end stage renal disease)  Hyperlipemia  Hemodialysis access, AV graft  Kidney failure  Diabetes  Essential hypertension  H/O  section    Patient is a 53y old  Female who presents with a chief complaint of Anomalous RCA, AS (18 Dec 2018 21:14)      14 system review was unremarkable  acute changes include acute respiratory failure  Vital signs, hemodynamic and respiratory parameters were reviewed from the bedside nursing flowsheet.  ICU Vital Signs Last 24 Hrs  T(C): 36.1 (18 Dec 2018 21:13), Max: 36.6 (17 Dec 2018 21:50)  T(F): 96.9 (18 Dec 2018 21:13), Max: 97.8 (17 Dec 2018 21:50)  HR: 88 (18 Dec 2018 21:00) (50 - 90)  BP: 135/65 (17 Dec 2018 23:00) (135/65 - 135/65)  BP(mean): 78 (17 Dec 2018 23:00) (78 - 78)  ABP: 108/44 (18 Dec 2018 21:00) (82/46 - 178/74)  ABP(mean): 60 (18 Dec 2018 21:00) (58 - 106)  RR: 22 (18 Dec 2018 21:00) (5 - 25)  SpO2: 98% (18 Dec 2018 21:00) (93% - 100%)    Adult Advanced Hemodynamics Last 24 Hrs  CVP(mm Hg): --  CVP(cm H2O): --  CO: --  CI: --  PA: --  PA(mean): --  PCWP: --  SVR: --  SVRI: --  PVR: --  PVRI: --, ABG - ( 18 Dec 2018 19:23 )  pH, Arterial: 7.46  pH, Blood: x     /  pCO2: 39    /  pO2: 196   / HCO3: 27    / Base Excess: 2.8   /  SaO2: 99                Mode: AC/ CMV (Assist Control/ Continuous Mandatory Ventilation)  RR (machine): 10  TV (machine): 600  FiO2: 50  PEEP: 5  ITime: 1  MAP: 9  PIP: 31    Intake and output was reviewed and the fluid balance was calculated  Daily     Daily Weight in k.8 (17 Dec 2018 21:50)  I&O's Summary    17 Dec 2018 07:  -  18 Dec 2018 07:00  --------------------------------------------------------  IN: 1224.9 mL / OUT: 400 mL / NET: 824.9 mL    18 Dec 2018 07:01  -  18 Dec 2018 21:21  --------------------------------------------------------  IN: 3090 mL / OUT: 2175 mL / NET: 915 mL        All lines and drain sites were assessed  [facility]      POCT Blood Glucose.: 131 mg/dL (18 Dec 2018 19:45)    No acute change in mental status  Auscultation of the chest reveals equal bs  Abdomen is soft  Extremities are warm and well perfused  Wounds appear clean and unremarkable  Antibiotics are periop    labs  CBC Full  -  ( 18 Dec 2018 18:18 )  WBC Count : 7.5 K/uL  Hemoglobin : 9.6 g/dL  Hematocrit : 28.4 %  Platelet Count - Automated : 80 K/uL  Mean Cell Volume : 86.3 fL  Mean Cell Hemoglobin : 29.2 pg  Mean Cell Hemoglobin Concentration : 33.8 g/dL  Auto Neutrophil # : x  Auto Lymphocyte # : x  Auto Monocyte # : x  Auto Eosinophil # : x  Auto Basophil # : x  Auto Neutrophil % : x  Auto Lymphocyte % : x  Auto Monocyte % : x  Auto Eosinophil % : x  Auto Basophil % : x    18    141  |  96  |  31<H>  ----------------------------<  143<H>  4.5   |  25  |  3.66<H>    Ca    9.9      18 Dec 2018 18:18  Phos  4.0     12-18  Mg     2.1     12-18    TPro  6.3  /  Alb  4.1  /  TBili  1.3<H>  /  DBili  0.4<H>  /  AST  60<H>  /  ALT  8<L>  /  AlkPhos  65  12-18    PT/INR - ( 18 Dec 2018 18:18 )   PT: 14.5 sec;   INR: 1.27          PTT - ( 18 Dec 2018 18:18 )  PTT:27.3 sec  The current medications were reviewed   MEDICATIONS  (STANDING):  acetaminophen  IVPB .. 1000 milliGRAM(s) IV Intermittent once  albumin human 25% IVPB      albumin human 25% IVPB 50 milliLiter(s) IV Intermittent once  albumin human 25% IVPB 50 milliLiter(s) IV Intermittent every 1 hour  albumin human 25% IVPB 50 milliLiter(s) IV Intermittent once  aspirin enteric coated 81 milliGRAM(s) Oral daily  buDESOnide   0.5 milliGRAM(s) Respule 0.5 milliGRAM(s) Inhalation every 12 hours  chlorhexidine 0.12% Liquid 5 milliLiter(s) Oral Mucosa every 4 hours  chlorhexidine 2% Cloths 1 Application(s) Topical daily  dexmedetomidine Infusion 0.5 MICROgram(s)/kG/Hr (10.325 mL/Hr) IV Continuous <Continuous>  dextrose 50% Injectable 50 milliLiter(s) IV Push every 15 minutes  dextrose 50% Injectable 25 milliLiter(s) IV Push every 15 minutes  heparin  Injectable 5000 Unit(s) SubCutaneous every 8 hours  insulin Infusion 1 Unit(s)/Hr (1 mL/Hr) IV Continuous <Continuous>  milrinone Infusion 0.25 MICROgram(s)/kG/Min (6.195 mL/Hr) IV Continuous <Continuous>  norepinephrine Infusion 0.02 MICROgram(s)/kG/Min (3.097 mL/Hr) IV Continuous <Continuous>  pantoprazole  Injectable 40 milliGRAM(s) IV Push daily  propofol Infusion 15 MICROgram(s)/kG/Min (7.434 mL/Hr) IV Continuous <Continuous>  sodium chloride 0.9%. 1000 milliLiter(s) (100 mL/Hr) IV Continuous <Continuous>    MEDICATIONS  (PRN):       PROBLEM LIST/ ASSESSMENT:  HEALTH ISSUES - PROBLEM Dx:  Cardiogenic shock: Cardiogenic shock  Renal bone disease: Renal bone disease  Anemia: Anemia  Essential hypertension: Essential hypertension  ESRD (end stage renal disease): ESRD (end stage renal disease)      ,   Patient is a 53y old  Female who presents with a chief complaint of Anomalous RCA, AS (18 Dec 2018 21:14)     s/p cardiac surgery      My plan includes :  close hemodynamic, ventilatory and drain monitoring and management per post op routine    Monitor for arrhythmias and monitor parameters for organ perfusion  monitor neurologic status  Head of the bed should remain elevated to 45 deg .   chest PT and IS will be encouraged  monitor adequacy of oxygenation and ventilation and attempt to wean oxygen  Nutritional goals will be met using po eventually , ensure adequate caloric intake and montior the same  Stress ulcer and VTE prophylaxis will be achieved    Glycemic control is satisfactory  Electrolytes have been repleted as necessary and wound care has been carried out. Pain control has been achieved.   agressive physical therapy and early mobility and ambulation goals will be met   The family was updated about the course and plan  CRITICAL CARE TIME SPENT in evaluation and management, reassessments, review and interpretation of labs and x-rays, ventilator and hemodynamic management, formulating a plan and coordinating care: ___90____ MIN.  Time does not include procedural time.  CTICU ATTENDING     					    Dontae Muhammad MD

## 2018-12-18 NOTE — PROGRESS NOTE ADULT - PROBLEM SELECTOR PLAN 2
hemoglobin acceptable at 10.2 at present  no CHOCO due to recent cardiac surgery  Obtain iron studies  Transfuse PRBC per primary team.

## 2018-12-19 LAB
ALBUMIN SERPL ELPH-MCNC: 3.8 G/DL — SIGNIFICANT CHANGE UP (ref 3.3–5)
ALBUMIN SERPL ELPH-MCNC: 3.8 G/DL — SIGNIFICANT CHANGE UP (ref 3.3–5)
ALBUMIN SERPL ELPH-MCNC: 4 G/DL — SIGNIFICANT CHANGE UP (ref 3.3–5)
ALP SERPL-CCNC: 103 U/L — SIGNIFICANT CHANGE UP (ref 40–120)
ALP SERPL-CCNC: 64 U/L — SIGNIFICANT CHANGE UP (ref 40–120)
ALP SERPL-CCNC: 76 U/L — SIGNIFICANT CHANGE UP (ref 40–120)
ALT FLD-CCNC: <5 U/L — LOW (ref 10–45)
ANION GAP SERPL CALC-SCNC: 20 MMOL/L — HIGH (ref 5–17)
ANION GAP SERPL CALC-SCNC: 20 MMOL/L — HIGH (ref 5–17)
ANION GAP SERPL CALC-SCNC: 21 MMOL/L — HIGH (ref 5–17)
ANION GAP SERPL CALC-SCNC: 23 MMOL/L — HIGH (ref 5–17)
APTT BLD: 25.6 SEC — LOW (ref 27.5–36.3)
APTT BLD: 27.6 SEC — SIGNIFICANT CHANGE UP (ref 27.5–36.3)
APTT BLD: 27.8 SEC — SIGNIFICANT CHANGE UP (ref 27.5–36.3)
AST SERPL-CCNC: 34 U/L — SIGNIFICANT CHANGE UP (ref 10–40)
AST SERPL-CCNC: 38 U/L — SIGNIFICANT CHANGE UP (ref 10–40)
AST SERPL-CCNC: 53 U/L — HIGH (ref 10–40)
BASE EXCESS BLDA CALC-SCNC: -2.9 MMOL/L — LOW (ref -2–3)
BASE EXCESS BLDA CALC-SCNC: 2.7 MMOL/L — SIGNIFICANT CHANGE UP (ref -2–3)
BASE EXCESS BLDV CALC-SCNC: 0.2 MMOL/L — SIGNIFICANT CHANGE UP
BILIRUB DIRECT SERPL-MCNC: 0.9 MG/DL — HIGH (ref 0–0.2)
BILIRUB INDIRECT FLD-MCNC: 0.7 MG/DL — SIGNIFICANT CHANGE UP (ref 0.2–1)
BILIRUB SERPL-MCNC: 0.6 MG/DL — SIGNIFICANT CHANGE UP (ref 0.2–1.2)
BILIRUB SERPL-MCNC: 1 MG/DL — SIGNIFICANT CHANGE UP (ref 0.2–1.2)
BILIRUB SERPL-MCNC: 1.6 MG/DL — HIGH (ref 0.2–1.2)
BUN SERPL-MCNC: 36 MG/DL — HIGH (ref 7–23)
BUN SERPL-MCNC: 38 MG/DL — HIGH (ref 7–23)
BUN SERPL-MCNC: 39 MG/DL — HIGH (ref 7–23)
BUN SERPL-MCNC: 41 MG/DL — HIGH (ref 7–23)
CALCIUM SERPL-MCNC: 8.8 MG/DL — SIGNIFICANT CHANGE UP (ref 8.4–10.5)
CALCIUM SERPL-MCNC: 8.8 MG/DL — SIGNIFICANT CHANGE UP (ref 8.4–10.5)
CALCIUM SERPL-MCNC: 9.2 MG/DL — SIGNIFICANT CHANGE UP (ref 8.4–10.5)
CALCIUM SERPL-MCNC: 9.4 MG/DL — SIGNIFICANT CHANGE UP (ref 8.4–10.5)
CHLORIDE SERPL-SCNC: 94 MMOL/L — LOW (ref 96–108)
CHLORIDE SERPL-SCNC: 96 MMOL/L — SIGNIFICANT CHANGE UP (ref 96–108)
CHLORIDE SERPL-SCNC: 97 MMOL/L — SIGNIFICANT CHANGE UP (ref 96–108)
CHLORIDE SERPL-SCNC: 97 MMOL/L — SIGNIFICANT CHANGE UP (ref 96–108)
CO2 SERPL-SCNC: 22 MMOL/L — SIGNIFICANT CHANGE UP (ref 22–31)
CO2 SERPL-SCNC: 22 MMOL/L — SIGNIFICANT CHANGE UP (ref 22–31)
CO2 SERPL-SCNC: 23 MMOL/L — SIGNIFICANT CHANGE UP (ref 22–31)
CO2 SERPL-SCNC: 26 MMOL/L — SIGNIFICANT CHANGE UP (ref 22–31)
CREAT SERPL-MCNC: 4.16 MG/DL — HIGH (ref 0.5–1.3)
CREAT SERPL-MCNC: 4.29 MG/DL — HIGH (ref 0.5–1.3)
CREAT SERPL-MCNC: 4.37 MG/DL — HIGH (ref 0.5–1.3)
CREAT SERPL-MCNC: 4.61 MG/DL — HIGH (ref 0.5–1.3)
GAS PNL BLDA: SIGNIFICANT CHANGE UP
GAS PNL BLDA: SIGNIFICANT CHANGE UP
GAS PNL BLDV: SIGNIFICANT CHANGE UP
GLUCOSE BLDC GLUCOMTR-MCNC: 100 MG/DL — HIGH (ref 70–99)
GLUCOSE BLDC GLUCOMTR-MCNC: 102 MG/DL — HIGH (ref 70–99)
GLUCOSE BLDC GLUCOMTR-MCNC: 103 MG/DL — HIGH (ref 70–99)
GLUCOSE BLDC GLUCOMTR-MCNC: 112 MG/DL — HIGH (ref 70–99)
GLUCOSE BLDC GLUCOMTR-MCNC: 113 MG/DL — HIGH (ref 70–99)
GLUCOSE BLDC GLUCOMTR-MCNC: 117 MG/DL — HIGH (ref 70–99)
GLUCOSE BLDC GLUCOMTR-MCNC: 122 MG/DL — HIGH (ref 70–99)
GLUCOSE BLDC GLUCOMTR-MCNC: 130 MG/DL — HIGH (ref 70–99)
GLUCOSE BLDC GLUCOMTR-MCNC: 133 MG/DL — HIGH (ref 70–99)
GLUCOSE BLDC GLUCOMTR-MCNC: 147 MG/DL — HIGH (ref 70–99)
GLUCOSE BLDC GLUCOMTR-MCNC: 166 MG/DL — HIGH (ref 70–99)
GLUCOSE BLDC GLUCOMTR-MCNC: 97 MG/DL — SIGNIFICANT CHANGE UP (ref 70–99)
GLUCOSE SERPL-MCNC: 116 MG/DL — HIGH (ref 70–99)
GLUCOSE SERPL-MCNC: 137 MG/DL — HIGH (ref 70–99)
GLUCOSE SERPL-MCNC: 161 MG/DL — HIGH (ref 70–99)
GLUCOSE SERPL-MCNC: 196 MG/DL — HIGH (ref 70–99)
GRAM STN FLD: SIGNIFICANT CHANGE UP
HCO3 BLDA-SCNC: 21 MMOL/L — SIGNIFICANT CHANGE UP (ref 21–28)
HCO3 BLDA-SCNC: 27 MMOL/L — SIGNIFICANT CHANGE UP (ref 21–28)
HCO3 BLDV-SCNC: 25 MMOL/L — SIGNIFICANT CHANGE UP (ref 20–27)
HCT VFR BLD CALC: 28.2 % — LOW (ref 34.5–45)
HCT VFR BLD CALC: 29.5 % — LOW (ref 34.5–45)
HCT VFR BLD CALC: 31.5 % — LOW (ref 34.5–45)
HCT VFR BLD CALC: 31.7 % — LOW (ref 34.5–45)
HGB BLD-MCNC: 10.4 G/DL — LOW (ref 11.5–15.5)
HGB BLD-MCNC: 10.6 G/DL — LOW (ref 11.5–15.5)
HGB BLD-MCNC: 9.3 G/DL — LOW (ref 11.5–15.5)
HGB BLD-MCNC: 9.6 G/DL — LOW (ref 11.5–15.5)
INR BLD: 1.34 — HIGH (ref 0.88–1.16)
INR BLD: 1.36 — HIGH (ref 0.88–1.16)
INR BLD: 1.42 — HIGH (ref 0.88–1.16)
INR BLD: 1.49 — HIGH (ref 0.88–1.16)
IRON SATN MFR SERPL: 66 % — HIGH (ref 14–50)
IRON SATN MFR SERPL: 89 UG/DL — SIGNIFICANT CHANGE UP (ref 30–160)
LACTATE SERPL-SCNC: 1.5 MMOL/L — SIGNIFICANT CHANGE UP (ref 0.5–2)
LACTATE SERPL-SCNC: 2 MMOL/L — SIGNIFICANT CHANGE UP (ref 0.5–2)
LACTATE SERPL-SCNC: 3.7 MMOL/L — HIGH (ref 0.5–2)
MAGNESIUM SERPL-MCNC: 1.9 MG/DL — SIGNIFICANT CHANGE UP (ref 1.6–2.6)
MAGNESIUM SERPL-MCNC: 2 MG/DL — SIGNIFICANT CHANGE UP (ref 1.6–2.6)
MAGNESIUM SERPL-MCNC: 2.1 MG/DL — SIGNIFICANT CHANGE UP (ref 1.6–2.6)
MCHC RBC-ENTMCNC: 28.1 PG — SIGNIFICANT CHANGE UP (ref 27–34)
MCHC RBC-ENTMCNC: 28.4 PG — SIGNIFICANT CHANGE UP (ref 27–34)
MCHC RBC-ENTMCNC: 28.6 PG — SIGNIFICANT CHANGE UP (ref 27–34)
MCHC RBC-ENTMCNC: 28.8 PG — SIGNIFICANT CHANGE UP (ref 27–34)
MCHC RBC-ENTMCNC: 32.5 G/DL — SIGNIFICANT CHANGE UP (ref 32–36)
MCHC RBC-ENTMCNC: 33 G/DL — SIGNIFICANT CHANGE UP (ref 32–36)
MCHC RBC-ENTMCNC: 33 G/DL — SIGNIFICANT CHANGE UP (ref 32–36)
MCHC RBC-ENTMCNC: 33.4 G/DL — SIGNIFICANT CHANGE UP (ref 32–36)
MCV RBC AUTO: 86.1 FL — SIGNIFICANT CHANGE UP (ref 80–100)
MCV RBC AUTO: 86.1 FL — SIGNIFICANT CHANGE UP (ref 80–100)
MCV RBC AUTO: 86.3 FL — SIGNIFICANT CHANGE UP (ref 80–100)
MCV RBC AUTO: 86.8 FL — SIGNIFICANT CHANGE UP (ref 80–100)
PCO2 BLDA: 31 MMHG — LOW (ref 32–45)
PCO2 BLDA: 42 MMHG — SIGNIFICANT CHANGE UP (ref 32–45)
PCO2 BLDV: 42 MMHG — SIGNIFICANT CHANGE UP (ref 41–51)
PH BLDA: 7.43 — SIGNIFICANT CHANGE UP (ref 7.35–7.45)
PH BLDA: 7.44 — SIGNIFICANT CHANGE UP (ref 7.35–7.45)
PH BLDV: 7.4 — SIGNIFICANT CHANGE UP (ref 7.32–7.43)
PHOSPHATE SERPL-MCNC: 6.1 MG/DL — HIGH (ref 2.5–4.5)
PHOSPHATE SERPL-MCNC: 6.3 MG/DL — HIGH (ref 2.5–4.5)
PHOSPHATE SERPL-MCNC: 6.5 MG/DL — HIGH (ref 2.5–4.5)
PLATELET # BLD AUTO: 44 K/UL — LOW (ref 150–400)
PLATELET # BLD AUTO: 63 K/UL — LOW (ref 150–400)
PLATELET # BLD AUTO: 68 K/UL — LOW (ref 150–400)
PLATELET # BLD AUTO: 88 K/UL — LOW (ref 150–400)
PO2 BLDA: 131 MMHG — HIGH (ref 83–108)
PO2 BLDA: 168 MMHG — HIGH (ref 83–108)
PO2 BLDV: 38 MMHG — SIGNIFICANT CHANGE UP
POTASSIUM SERPL-MCNC: 4.5 MMOL/L — SIGNIFICANT CHANGE UP (ref 3.5–5.3)
POTASSIUM SERPL-MCNC: 5.2 MMOL/L — SIGNIFICANT CHANGE UP (ref 3.5–5.3)
POTASSIUM SERPL-MCNC: 5.5 MMOL/L — HIGH (ref 3.5–5.3)
POTASSIUM SERPL-MCNC: 5.5 MMOL/L — HIGH (ref 3.5–5.3)
POTASSIUM SERPL-SCNC: 4.5 MMOL/L — SIGNIFICANT CHANGE UP (ref 3.5–5.3)
POTASSIUM SERPL-SCNC: 5.2 MMOL/L — SIGNIFICANT CHANGE UP (ref 3.5–5.3)
POTASSIUM SERPL-SCNC: 5.5 MMOL/L — HIGH (ref 3.5–5.3)
POTASSIUM SERPL-SCNC: 5.5 MMOL/L — HIGH (ref 3.5–5.3)
PROT SERPL-MCNC: 5.7 G/DL — LOW (ref 6–8.3)
PROT SERPL-MCNC: 5.9 G/DL — LOW (ref 6–8.3)
PROT SERPL-MCNC: 6 G/DL — SIGNIFICANT CHANGE UP (ref 6–8.3)
PROTHROM AB SERPL-ACNC: 15.3 SEC — HIGH (ref 10–12.9)
PROTHROM AB SERPL-ACNC: 15.5 SEC — HIGH (ref 10–12.9)
PROTHROM AB SERPL-ACNC: 16.2 SEC — HIGH (ref 10–12.9)
PROTHROM AB SERPL-ACNC: 17.1 SEC — HIGH (ref 10–12.9)
RBC # BLD: 3.25 M/UL — LOW (ref 3.8–5.2)
RBC # BLD: 3.42 M/UL — LOW (ref 3.8–5.2)
RBC # BLD: 3.66 M/UL — LOW (ref 3.8–5.2)
RBC # BLD: 3.68 M/UL — LOW (ref 3.8–5.2)
RBC # FLD: 15.8 % — SIGNIFICANT CHANGE UP (ref 10.3–16.9)
RBC # FLD: 16 % — SIGNIFICANT CHANGE UP (ref 10.3–16.9)
RBC # FLD: 16.2 % — SIGNIFICANT CHANGE UP (ref 10.3–16.9)
RBC # FLD: 16.2 % — SIGNIFICANT CHANGE UP (ref 10.3–16.9)
SAO2 % BLDA: 99 % — SIGNIFICANT CHANGE UP (ref 95–100)
SAO2 % BLDA: 99 % — SIGNIFICANT CHANGE UP (ref 95–100)
SAO2 % BLDV: 71 % — SIGNIFICANT CHANGE UP
SODIUM SERPL-SCNC: 139 MMOL/L — SIGNIFICANT CHANGE UP (ref 135–145)
SODIUM SERPL-SCNC: 140 MMOL/L — SIGNIFICANT CHANGE UP (ref 135–145)
SODIUM SERPL-SCNC: 140 MMOL/L — SIGNIFICANT CHANGE UP (ref 135–145)
SODIUM SERPL-SCNC: 142 MMOL/L — SIGNIFICANT CHANGE UP (ref 135–145)
TIBC SERPL-MCNC: 135 UG/DL — LOW (ref 220–430)
UIBC SERPL-MCNC: 46 UG/DL — LOW (ref 110–370)
WBC # BLD: 7.1 K/UL — SIGNIFICANT CHANGE UP (ref 3.8–10.5)
WBC # BLD: 7.2 K/UL — SIGNIFICANT CHANGE UP (ref 3.8–10.5)
WBC # BLD: 7.4 K/UL — SIGNIFICANT CHANGE UP (ref 3.8–10.5)
WBC # BLD: 7.8 K/UL — SIGNIFICANT CHANGE UP (ref 3.8–10.5)
WBC # FLD AUTO: 7.1 K/UL — SIGNIFICANT CHANGE UP (ref 3.8–10.5)
WBC # FLD AUTO: 7.2 K/UL — SIGNIFICANT CHANGE UP (ref 3.8–10.5)
WBC # FLD AUTO: 7.4 K/UL — SIGNIFICANT CHANGE UP (ref 3.8–10.5)
WBC # FLD AUTO: 7.8 K/UL — SIGNIFICANT CHANGE UP (ref 3.8–10.5)

## 2018-12-19 PROCEDURE — 93503 INSERT/PLACE HEART CATHETER: CPT

## 2018-12-19 PROCEDURE — 99292 CRITICAL CARE ADDL 30 MIN: CPT | Mod: 25

## 2018-12-19 PROCEDURE — 99291 CRITICAL CARE FIRST HOUR: CPT | Mod: 25

## 2018-12-19 PROCEDURE — 99292 CRITICAL CARE ADDL 30 MIN: CPT

## 2018-12-19 PROCEDURE — 71045 X-RAY EXAM CHEST 1 VIEW: CPT | Mod: 26

## 2018-12-19 PROCEDURE — 71045 X-RAY EXAM CHEST 1 VIEW: CPT | Mod: 26,77

## 2018-12-19 PROCEDURE — 76937 US GUIDE VASCULAR ACCESS: CPT | Mod: 26

## 2018-12-19 PROCEDURE — 36556 INSERT NON-TUNNEL CV CATH: CPT | Mod: 59

## 2018-12-19 PROCEDURE — 31500 INSERT EMERGENCY AIRWAY: CPT

## 2018-12-19 RX ORDER — ALBUMIN HUMAN 25 %
50 VIAL (ML) INTRAVENOUS
Qty: 0 | Refills: 0 | Status: COMPLETED | OUTPATIENT
Start: 2018-12-19 | End: 2018-12-19

## 2018-12-19 RX ORDER — ALBUMIN HUMAN 25 %
250 VIAL (ML) INTRAVENOUS ONCE
Qty: 0 | Refills: 0 | Status: COMPLETED | OUTPATIENT
Start: 2018-12-19 | End: 2018-12-19

## 2018-12-19 RX ORDER — ONDANSETRON 8 MG/1
4 TABLET, FILM COATED ORAL ONCE
Qty: 0 | Refills: 0 | Status: COMPLETED | OUTPATIENT
Start: 2018-12-19 | End: 2018-12-19

## 2018-12-19 RX ORDER — SODIUM POLYSTYRENE SULFONATE 4.1 MEQ/G
30 POWDER, FOR SUSPENSION ORAL ONCE
Qty: 0 | Refills: 0 | Status: COMPLETED | OUTPATIENT
Start: 2018-12-19 | End: 2018-12-19

## 2018-12-19 RX ORDER — ACETAMINOPHEN 500 MG
1000 TABLET ORAL ONCE
Qty: 0 | Refills: 0 | Status: COMPLETED | OUTPATIENT
Start: 2018-12-19 | End: 2018-12-19

## 2018-12-19 RX ORDER — ATORVASTATIN CALCIUM 80 MG/1
40 TABLET, FILM COATED ORAL AT BEDTIME
Qty: 0 | Refills: 0 | Status: DISCONTINUED | OUTPATIENT
Start: 2018-12-19 | End: 2018-12-20

## 2018-12-19 RX ORDER — ALBUMIN HUMAN 25 %
250 VIAL (ML) INTRAVENOUS
Qty: 0 | Refills: 0 | Status: COMPLETED | OUTPATIENT
Start: 2018-12-19 | End: 2018-12-19

## 2018-12-19 RX ORDER — ERYTHROPOIETIN 10000 [IU]/ML
10000 INJECTION, SOLUTION INTRAVENOUS; SUBCUTANEOUS ONCE
Qty: 0 | Refills: 0 | Status: COMPLETED | OUTPATIENT
Start: 2018-12-19 | End: 2018-12-19

## 2018-12-19 RX ORDER — PROPOFOL 10 MG/ML
20 INJECTION, EMULSION INTRAVENOUS
Qty: 1000 | Refills: 0 | Status: DISCONTINUED | OUTPATIENT
Start: 2018-12-19 | End: 2018-12-20

## 2018-12-19 RX ADMIN — Medication 1000 MILLIGRAM(S): at 20:23

## 2018-12-19 RX ADMIN — CHLORHEXIDINE GLUCONATE 1 APPLICATION(S): 213 SOLUTION TOPICAL at 06:18

## 2018-12-19 RX ADMIN — CHLORHEXIDINE GLUCONATE 5 MILLILITER(S): 213 SOLUTION TOPICAL at 02:15

## 2018-12-19 RX ADMIN — Medication 125 MILLILITER(S): at 12:30

## 2018-12-19 RX ADMIN — CHLORHEXIDINE GLUCONATE 5 MILLILITER(S): 213 SOLUTION TOPICAL at 19:39

## 2018-12-19 RX ADMIN — Medication 400 MILLIGRAM(S): at 06:19

## 2018-12-19 RX ADMIN — CHLORHEXIDINE GLUCONATE 5 MILLILITER(S): 213 SOLUTION TOPICAL at 22:54

## 2018-12-19 RX ADMIN — Medication 125 MILLILITER(S): at 13:00

## 2018-12-19 RX ADMIN — Medication 0.5 MILLIGRAM(S): at 18:00

## 2018-12-19 RX ADMIN — ONDANSETRON 4 MILLIGRAM(S): 8 TABLET, FILM COATED ORAL at 02:50

## 2018-12-19 RX ADMIN — Medication 125 MILLILITER(S): at 11:45

## 2018-12-19 RX ADMIN — Medication 0.5 MILLIGRAM(S): at 06:51

## 2018-12-19 RX ADMIN — Medication 400 MILLIGRAM(S): at 20:01

## 2018-12-19 RX ADMIN — Medication 125 MILLILITER(S): at 12:45

## 2018-12-19 RX ADMIN — HEPARIN SODIUM 5000 UNIT(S): 5000 INJECTION INTRAVENOUS; SUBCUTANEOUS at 06:19

## 2018-12-19 RX ADMIN — CHLORHEXIDINE GLUCONATE 5 MILLILITER(S): 213 SOLUTION TOPICAL at 06:16

## 2018-12-19 RX ADMIN — Medication 125 MILLILITER(S): at 13:55

## 2018-12-19 RX ADMIN — Medication 1000 MILLIGRAM(S): at 06:44

## 2018-12-19 RX ADMIN — ONDANSETRON 4 MILLIGRAM(S): 8 TABLET, FILM COATED ORAL at 20:00

## 2018-12-19 NOTE — PROGRESS NOTE ADULT - SUBJECTIVE AND OBJECTIVE BOX
Patient was seen and evaluated on dialysis after mechanical ventilatory support. Patient on IV pressors at present started on IHD. Patient's blood pressure decreased to 80 to 90's systolic. Dialysis treatment was aborted. Hemodynamics were supported with IV pressors with maintainance of SBP>100 mmhg. Discuss with ICU attending as well Nephrology attending Dr. Gill regarding discontinuation of IHD treatment due to hemodynamic instability. Will re-eval for need for CVVHD later today.

## 2018-12-19 NOTE — PROGRESS NOTE ADULT - ASSESSMENT
This is 53 year old female with PMH stated above, now s/p aortic valve replacement and mitral valve repair. S/p surgical intervention post op day#3. Last HD on 12/18. Interval extubation on high flow oxygen today.

## 2018-12-19 NOTE — PROGRESS NOTE ADULT - PROBLEM SELECTOR PLAN 3
Calcium 9.4 and phos 6.1  Obtain PTH, Vitamin D 25, VItamin D 1,25 level.  No phos binder at present.  Monitor calcium and phosphorus level

## 2018-12-19 NOTE — PROGRESS NOTE ADULT - ATTENDING COMMENTS
D/w Dr. Hoover
Events noted and discussed with Dr. Hoover throughout the day.
Pt seen on rounds.  Events noted.  Plan d/w Dr. Hoover and team.
Case reviewed with Dr. Hoover at length.  Agree with note above.  We will continue to follow closely with you.
Case reviewed throughout the day and discussed with Dr. Hoover at length.  Plans d/w team.

## 2018-12-19 NOTE — PHYSICAL THERAPY INITIAL EVALUATION ADULT - GENERAL OBSERVATIONS, REHAB EVAL
patient received seated out of bed with no acute distress, +EKG, spencer, central line, +temporary pacemaker, +ervin to wall suction x 3, quintero, HFNC FiO2 40% however placed on BIPAP for PT for RT Rudi-per Dr. Muhammad

## 2018-12-19 NOTE — PROGRESS NOTE ADULT - SUBJECTIVE AND OBJECTIVE BOX
CTICU  CRITICAL  CARE  attending     Hand off received 					   Pertinent clinical, laboratory, radiographic, hemodynamic, echocardiographic, respiratory data, microbiologic data and chart were reviewed and analyzed frequently throughout the course of the day and night  Patient seen and examined with CTS/ SH attending at bedside  Pt is a 53y , Female, HEALTH ISSUES - PROBLEM Dx:  Cardiogenic shock: Cardiogenic shock  Renal bone disease: Renal bone disease  Anemia: Anemia  Essential hypertension: Essential hypertension  ESRD (end stage renal disease): ESRD (end stage renal disease)      , FAMILY HISTORY:  Family history of diabetes mellitus (Mother)  PAST MEDICAL & SURGICAL HISTORY:  Pulmonary embolism  ESRD (end stage renal disease)  Hyperlipemia  Hemodialysis access, AV graft  Kidney failure  Diabetes  Essential hypertension  H/O  section    Patient is a 53y old  Female who presents with a chief complaint of Anomalous RCA, AS (20 Dec 2018 05:22)      14 system review was unremarkable  acute changes include acute respiratory failure  Vital signs, hemodynamic and respiratory parameters were reviewed from the bedside nursing flowsheet.  ICU Vital Signs Last 24 Hrs  T(C): 36 (20 Dec 2018 02:00), Max: 36.2 (19 Dec 2018 21:16)  T(F): 96.8 (20 Dec 2018 02:00), Max: 97.1 (19 Dec 2018 21:16)  HR: 72 (20 Dec 2018 03:28) (62 - 90)  BP: --  BP(mean): --  ABP: 88/42 (20 Dec 2018 03:28) (88/42 - 146/60)  ABP(mean): 54 (20 Dec 2018 03:28) (54 - 82)  RR: 12 (20 Dec 2018 03:28) (9 - 17)  SpO2: 98% (20 Dec 2018 03:28) (96% - 100%)    Adult Advanced Hemodynamics Last 24 Hrs  CVP(mm Hg): --  CVP(cm H2O): --  CO: 5.2 (20 Dec 2018 02:00) (4 - 7.2)  CI: 2.8 (20 Dec 2018 02:00) (2.2 - 3.8)  PA: 76/33 (20 Dec 2018 03:28) (23/15 - 76/33)  PA(mean): 47 (20 Dec 2018 03:28) (17 - 54)  PCWP: --  SVR: --  SVRI: --  PVR: --  PVRI: --, ABG - ( 20 Dec 2018 04:29 )  pH, Arterial: 7.03  pH, Blood: x     /  pCO2: 75    /  pO2: 12    / HCO3: 19    / Base Excess: -11.1 /  SaO2: not analyzed             Mode: AC/ CMV (Assist Control/ Continuous Mandatory Ventilation)  RR (machine): 12  TV (machine): 500  FiO2: 40  PEEP: 8  ITime: 0.9  MAP: 14  PIP: 29    Intake and output was reviewed and the fluid balance was calculated  Daily     Daily   I&O's Summary    19 Dec 2018 07:01  -  20 Dec 2018 07:00  --------------------------------------------------------  IN: 3732.6 mL / OUT: 386 mL / NET: 3346.6 mL        All lines and drain sites were assessed  [facility]      POCT Blood Glucose.: 101 mg/dL (20 Dec 2018 02:49)    No acute change in mental status  Auscultation of the chest reveals equal bs  Abdomen is soft  Extremities are warm and well perfused  Wounds appear clean and unremarkable  Antibiotics are periop    labs  CBC Full  -  ( 20 Dec 2018 04:28 )  WBC Count : 6.9 K/uL  Hemoglobin : 9.3 g/dL  Hematocrit : 29.0 %  Platelet Count - Automated : 42 K/uL  Mean Cell Volume : 90.9 fL  Mean Cell Hemoglobin : 29.2 pg  Mean Cell Hemoglobin Concentration : 32.1 g/dL  Auto Neutrophil # : x  Auto Lymphocyte # : x  Auto Monocyte # : x  Auto Eosinophil # : x  Auto Basophil # : x  Auto Neutrophil % : x  Auto Lymphocyte % : x  Auto Monocyte % : x  Auto Eosinophil % : x  Auto Basophil % : x    12-20    147<H>  |  99  |  31<H>  ----------------------------<  264<H>  4.9   |  18<L>  |  4.10<H>    Ca    11.4<H>      20 Dec 2018 04:28  Phos  8.7     12-20  Mg     2.1     12-20    TPro  5.6<L>  /  Alb  4.0  /  TBili  0.7  /  DBili  x   /  AST  25  /  ALT  <5<L>  /  AlkPhos  77  12-20    PT/INR - ( 20 Dec 2018 04:33 )   PT: 20.3 sec;   INR: 1.77          PTT - ( 20 Dec 2018 04:33 )  PTT:36.4 sec  The current medications were reviewed   MEDICATIONS  (STANDING):  atorvastatin 40 milliGRAM(s) Oral at bedtime  buDESOnide   0.5 milliGRAM(s) Respule 0.5 milliGRAM(s) Inhalation every 12 hours  chlorhexidine 0.12% Liquid 5 milliLiter(s) Oral Mucosa every 4 hours  chlorhexidine 2% Cloths 1 Application(s) Topical daily  CRRT Treatment    <Continuous>  dextrose 50% Injectable 50 milliLiter(s) IV Push every 15 minutes  dextrose 50% Injectable 25 milliLiter(s) IV Push every 15 minutes  insulin Infusion 1 Unit(s)/Hr (1 mL/Hr) IV Continuous <Continuous>  milrinone Infusion 0.25 MICROgram(s)/kG/Min (6.195 mL/Hr) IV Continuous <Continuous>  norepinephrine Infusion 0.02 MICROgram(s)/kG/Min (3.097 mL/Hr) IV Continuous <Continuous>  propofol Infusion 20 MICROgram(s)/kG/Min (9.912 mL/Hr) IV Continuous <Continuous>  PureFlow Dialysate RFP-400 (K 2 / Ca 3) 5000 milliLiter(s) (2000 mL/Hr) CRRT <Continuous>  sodium chloride 0.9%. 1000 milliLiter(s) (100 mL/Hr) IV Continuous <Continuous>  vasopressin Infusion 0.03 Unit(s)/Min (1.8 mL/Hr) IV Continuous <Continuous>    MEDICATIONS  (PRN):       PROBLEM LIST/ ASSESSMENT:  HEALTH ISSUES - PROBLEM Dx:  Cardiogenic shock: Cardiogenic shock  Renal bone disease: Renal bone disease  Anemia: Anemia  Essential hypertension: Essential hypertension  ESRD (end stage renal disease): ESRD (end stage renal disease)      ,   Patient is a 53y old  Female who presents with a chief complaint of Anomalous RCA, AS (20 Dec 2018 05:22)     s/p cardiac surgery      My plan includes :  close hemodynamic, ventilatory and drain monitoring and management per post op routine    near arrest on initiation of HD resuscitated with rescue meds  intubated promptly with 8.0 ett    near areest on resumption of HD with slow blood flow rates     HD held off    rise in K to 5.5 with AG 19 and 3 litre positive fluid balance    will plan CVVH with very gentle blood flow rates    Monitor for arrhythmias and monitor parameters for organ perfusion  monitor neurologic status  Head of the bed should remain elevated to 45 deg .   chest PT and IS will be encouraged  monitor adequacy of oxygenation and ventilation and attempt to wean oxygen  Nutritional goals will be met using po eventually , ensure adequate caloric intake and montior the same  Stress ulcer and VTE prophylaxis will be achieved    Glycemic control is satisfactory  Electrolytes have been repleted as necessary and wound care has been carried out. Pain control has been achieved.   agressive physical therapy and early mobility and ambulation goals will be met   The family was updated about the course and plan  CRITICAL CARE TIME SPENT in evaluation and management, reassessments, review and interpretation of labs and x-rays, ventilator and hemodynamic management, formulating a plan and coordinating care: ___90____ MIN.  Time does not include procedural time.  CTICU ATTENDING     					    Dontae Muhammad MD

## 2018-12-19 NOTE — PROVIDER CONTACT NOTE (CHANGE IN STATUS NOTIFICATION) - ASSESSMENT
Patient became hypotensive approx 5 min into tx. Alerted Medical Team sta and at bedside. Patient became hypotensive approx 5 min into tx. Alerted Medical Team and at bedside.

## 2018-12-19 NOTE — PHYSICAL THERAPY INITIAL EVALUATION ADULT - PERTINENT HX OF CURRENT PROBLEM, REHAB EVAL
53 year old female consulted by CT Surgery for evaluation of aortic stenosis and an anomalous RCA. Dr. Nascimento feels the patient will benefit and is a candidate for Sternotomy, AVR, Unroofing of Right Coronary Artery, Possible Bypass.

## 2018-12-19 NOTE — PROCEDURE NOTE - NSINFORMCONSENT_GEN_A_CORE
This was an emergent procedure.
This was an emergent procedure.
Benefits, risks, and possible complications of procedure explained to patient/caregiver who verbalized understanding and gave written consent.

## 2018-12-19 NOTE — PROVIDER CONTACT NOTE (CHANGE IN STATUS NOTIFICATION) - ASSESSMENT
Pt A&Ox3, sitting in bed. Hemodynamically stable on vaso 2u, primacor 0.25mcg. Dialysis RN started treatment, pt went hypotensive. Immediately alerted medical team.

## 2018-12-19 NOTE — PROVIDER CONTACT NOTE (CHANGE IN STATUS NOTIFICATION) - ACTION/TREATMENT ORDERED:
Blood rinsed back, HD tx terminated.  Medical Team at bedside, ER medication given. Blood rinsed back, HD tx terminated.  Medical Team at bedside, ER medication given. Dr Hoover notified pre and post initiation of tx.

## 2018-12-19 NOTE — PROCEDURE NOTE - ADDITIONAL PROCEDURE DETAILS
attempted right IJ  unable to float wire  successful single attempt via left IJ    swan kesha cath floated without difficulty balloon guided  PA pressures 70/40 at 55 cms length

## 2018-12-19 NOTE — PROVIDER CONTACT NOTE (CHANGE IN STATUS NOTIFICATION) - ACTION/TREATMENT ORDERED:
Blood rinsed back, approximately 800 ml of nss given on rinse back. HD tx discontinued. Emegency medications given by the Team. Blood rinsed back, approximately 800 ml of nss given on rinse back. HD tx discontinued. Emegency medications given by the Team. Dr Hoover notified. Seen patient. Blood rinsed back, approximately 800 ml of nss given on rinse back. HD tx discontinued. Emergency medications given by the Team. Dr Hoover notified. Seen patient.

## 2018-12-19 NOTE — PROVIDER CONTACT NOTE (CHANGE IN STATUS NOTIFICATION) - ASSESSMENT
Patient A/Ox3 on bed at initiation of Hd .  Vs stable, BF at 100 gradually increased to 150, 200 , no UF . Patient went hypotensive  , immediately alerted medical team  and at bedside.

## 2018-12-19 NOTE — PROGRESS NOTE ADULT - PROBLEM SELECTOR PLAN 2
Anemia of chronic renal disease with Hgb 9.3 at present  Obtain iron studies  CHOCO during HD treatment  Monitor hemoglobin  Transfuse PRBC per primary team.

## 2018-12-19 NOTE — PROGRESS NOTE ADULT - SUBJECTIVE AND OBJECTIVE BOX
Patient was seen and evaluated on dialysis developed hypotension after 7 minutes of treatment with requiring increasing dose of IV pressors and mechanical ventilatory support. HD treatment was aborted at present for stabilization of patient prior to restarting HD.  HR: 88 (12-19-18 @ 11:00)  BP: 108/48 (12-19-18 @ 06:00)  Continue dialysis:   Dialyzer:  Optiflux 180        QB: 400        QD: 500 2K+  Goal UF 0 kg over 3.5 Hours

## 2018-12-19 NOTE — PROGRESS NOTE ADULT - SUBJECTIVE AND OBJECTIVE BOX
CTICU  CRITICAL  CARE  attending     Hand off received 					   Pertinent clinical, laboratory, radiographic, hemodynamic, echocardiographic, respiratory data, microbiologic data and chart were reviewed and analyzed frequently throughout the course of the day and night  Patient seen and examined with CTS/ SH attending at bedside    53 year old female, [place] speaking, former smoker, with a past medical history of hypertension, hyperlipidemia, PE (2016, on anticoagulation for 6 months), ESRD on dialysis (Tues, Thurs, Sat.), iron deficient anemia, consulted by CT Surgery for management of aortic stenosis and an anomalous RCA.   Patient presented to her cardiologist Dr. India Haley complaining of exertional dyspnea and angina with moderate activity (beginning since April). She reports that she gets winded after walking up 1 flight of stairs and 1-2 city blocks. She also endorses bilateral lower extremity edema. Patient denies any fever, chills, fatigue, syncope, acute chest pain, palpitations, SOB, orthopnea, paroxysmal nocturnal dyspnea, vomiting, abdominal pain, or back pain.  Due to patient's symptoms, she underwent the tests below:  Cardiac catheterization on 18 revealed: Nonobstructive CAD. 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   Echocardiogram 18 revealed: LVEF 55-60%. Aortic  valve is trileaflet and is moderately thickened. Moderate  aortic regurgitation. Severe  aortic stenosis with a peak gradient of 77.43mmhg and mean gradient of 49.28mmHg  Extubated 18.  Reintubated 18.      HEALTH ISSUES - PROBLEM Dx:  Cardiogenic shock: Cardiogenic shock  Renal bone disease: Renal bone disease  Anemia: Anemia  Essential hypertension: Essential hypertension  ESRD (end stage renal disease): ESRD (end stage renal disease)      FAMILY HISTORY:  Family history of diabetes mellitus (Mother)  PAST MEDICAL & SURGICAL HISTORY:  Pulmonary embolism  ESRD (end stage renal disease)  Hyperlipemia  Hemodialysis access, AV graft  Kidney failure  Diabetes  Essential hypertension  H/O  section    Patient is a 53y old  Female who presents with a chief complaint of Anomalous RCA, AS (19 Dec 2018 13:15)      14 system review was unremarkable  acute changes include acute respiratory failure  Vital signs, hemodynamic and respiratory parameters were reviewed from the bedside nursing flow sheet.  ICU Vital Signs Last 24 Hrs  T(C): 36.2 (19 Dec 2018 21:16), Max: 36.3 (19 Dec 2018 12:15)  T(F): 97.1 (19 Dec 2018 21:16), Max: 97.3 (19 Dec 2018 12:15)  HR: 72 (19 Dec 2018 22:00) (62 - 132)  BP: 108/48 (19 Dec 2018 06:00) (108/48 - 108/48)  BP(mean): 60 (19 Dec 2018 06:00) (60 - 60)  ABP: 134/56 (19 Dec 2018 22:00) (46/34 - 276/82)  ABP(mean): 74 (19 Dec 2018 22:00) (0 - 142)  RR: 13 (19 Dec 2018 22:00) (9 - 25)  SpO2: 100% (19 Dec 2018 22:00) (94% - 100%)    Adult Advanced Hemodynamics Last 24 Hrs  CVP(mm Hg): --  CVP(cm H2O): --  CO: 5.1 (19 Dec 2018 22:00) (4 - 7.2)  CI: 2.8 (19 Dec 2018 22:00) (2.2 - 3.8)  PA: 64/28 (19 Dec 2018 22:00) (23/15 - 74/37)  PA(mean): 40 (19 Dec 2018 22:00) (17 - 54)  PCWP: --  SVR: --  SVRI: --  PVR: --  PVRI: --, ABG - ( 19 Dec 2018 23:10 )  pH, Arterial: 7.44  pH, Blood: x     /  pCO2: 36    /  pO2: 158   / HCO3: 24    / Base Excess: 0.4   /  SaO2: 99                Mode: AC/ CMV (Assist Control/ Continuous Mandatory Ventilation)  RR (machine): 12  TV (machine): 500  FiO2: 40  PEEP: 8  ITime: 0.9  MAP: 13  PIP: 28    Intake and output was reviewed and the fluid balance was calculated  Daily     Daily Weight in k.5 (19 Dec 2018 10:56)  I&O's Summary    18 Dec 2018 07:01  -  19 Dec 2018 07:00  --------------------------------------------------------  IN: 3571 mL / OUT: 2400 mL / NET: 1171 mL    19 Dec 2018 07:01  -  19 Dec 2018 23:14  --------------------------------------------------------  IN: 3503.9 mL / OUT: 20 mL / NET: 3483.9 mL        All lines and drain sites were assessed  Glycemic trend was reviewed CAPILLARY BLOOD GLUCOSE      POCT Blood Glucose.: 133 mg/dL (19 Dec 2018 21:26)      NEURO: Sedated.  CVS: [place]  S1, S2, No S3.  RESPIRATORY: Auscultation of the chest reveals bibasilar rales.  GI: Abdomen is soft  Extremities are warm and well perfused.  VASCULAR: + Distal pulses.  Wounds appear clean and unremarkable        labs  CBC Full  -  ( 19 Dec 2018 17:06 )  WBC Count : 7.1 K/uL  Hemoglobin : 10.6 g/dL  Hematocrit : 31.7 %  Platelet Count - Automated : 44 K/uL  Mean Cell Volume : 86.1 fL  Mean Cell Hemoglobin : 28.8 pg  Mean Cell Hemoglobin Concentration : 33.4 g/dL  Auto Neutrophil # : x  Auto Lymphocyte # : x  Auto Monocyte # : x  Auto Eosinophil # : x  Auto Basophil # : x  Auto Neutrophil % : x  Auto Lymphocyte % : x  Auto Monocyte % : x  Auto Eosinophil % : x  Auto Basophil % : x        139  |  94<L>  |  38<H>  ----------------------------<  196<H>  5.5<H>   |  22  |  4.29<H>    Ca    8.8      19 Dec 2018 17:06  Phos  6.3       Mg     2.1         TPro  5.9<L>  /  Alb  4.0  /  TBili  1.6<H>  /  DBili  0.9<H>  /  AST  38  /  ALT  <5<L>  /  AlkPhos  76      PT/INR - ( 19 Dec 2018 17:07 )   PT: 17.1 sec;   INR: 1.49          PTT - ( 19 Dec 2018 17:07 )  PTT:27.8 sec  The current medications were reviewed   MEDICATIONS  (STANDING):  atorvastatin 40 milliGRAM(s) Oral at bedtime  buDESOnide   0.5 milliGRAM(s) Respule 0.5 milliGRAM(s) Inhalation every 12 hours  chlorhexidine 0.12% Liquid 5 milliLiter(s) Oral Mucosa every 4 hours  chlorhexidine 2% Cloths 1 Application(s) Topical daily  CRRT Treatment    <Continuous>  dextrose 50% Injectable 50 milliLiter(s) IV Push every 15 minutes  dextrose 50% Injectable 25 milliLiter(s) IV Push every 15 minutes  insulin Infusion 1 Unit(s)/Hr (1 mL/Hr) IV Continuous <Continuous>  milrinone Infusion 0.25 MICROgram(s)/kG/Min (6.195 mL/Hr) IV Continuous <Continuous>  norepinephrine Infusion 0.02 MICROgram(s)/kG/Min (3.097 mL/Hr) IV Continuous <Continuous>  propofol Infusion 20 MICROgram(s)/kG/Min (9.912 mL/Hr) IV Continuous <Continuous>  PureFlow Dialysate RFP-400 (K 2 / Ca 3) 5000 milliLiter(s) (2000 mL/Hr) CRRT <Continuous>  sodium chloride 0.9%. 1000 milliLiter(s) (100 mL/Hr) IV Continuous <Continuous>  vasopressin Infusion 0.03 Unit(s)/Min (1.8 mL/Hr) IV Continuous <Continuous>    MEDICATIONS  (PRN):      PROBLEM LIST/ ASSESSMENT:  HEALTH ISSUES - PROBLEM Dx:  Cardiogenic shock: Cardiogenic shock  Renal bone disease: Renal bone disease  Anemia: Anemia  Essential hypertension: Essential hypertension  ESRD (end stage renal disease): ESRD (end stage renal disease)      Patient is a 53y old  Female who presents with  chief complaint of Anomalous RCA, AS, MR.  S/P AVR  S/P MV Repair  S/P Unroofing of RCA.  S/P Extubation 18.  S/P Hemodialysis (Did not tolerate) -> Hemodynamic instability-> Reintubated. PA catheter inserted.  PA systolic in 70's.  Hemodynamically stable now  Fair oxygenation.  Hyperkalemic metabolic acidosis.  Uncontrolled DM.         My plan includes :  Start CVVHD.   Continue full vent support.  IV milrinone for right support.  Close hemodynamic, ventilatory and drain monitoring and management.  Monitor for arrhythmias and monitor parameters for organ perfusion  Monitor neurologic status  Head of the bed should remain elevated to 45 deg .   Chest PT and IS will be encouraged  Monitor adequacy of oxygenation and ventilation and attempt to wean oxygen  Nutritional goals will be met using po eventually , ensure adequate caloric intake and monitor the same  Stress ulcer and VTE prophylaxis will be achieved    OPTIMIZE Glycemic control.  Electrolytes have been repleted as necessary and wound care has been carried out. Pain control has been achieved.   Aggressive  physical therapy and early mobility and ambulation goals will be met   The family was updated about the course and plan  CRITICAL CARE TIME SPENT in evaluation and management, reassessments, review and interpretation of labs and x-rays, ventilator and hemodynamic management, formulating a plan and coordinating care: ___30____ MIN.  Time does not include procedural time.  CTICU ATTENDING     					    Parker Aguilar MD

## 2018-12-19 NOTE — PROCEDURE NOTE - NSPROCDETAILS_GEN_ALL_CORE
patient pre-oxygenated, tube inserted, placement confirmed
ultrasound guidance
lumen(s) aspirated and flushed/sterile dressing applied/ultrasound guidance/guidewire recovered/sterile technique, catheter placed

## 2018-12-19 NOTE — PROGRESS NOTE ADULT - SUBJECTIVE AND OBJECTIVE BOX
Patient is a 53y Female seen and evaluated at bedside. Patient with interval extubation now on High flow oxygen therapy today morning on minimal IV pressors at present. Vital signs stable with BP ranging from 120 to 130s at present. Chest xray with pulmonary vascular congestion. Chest surgical scar with dressing noted. No active bleeding. Interval HD treatment on 12/18 with 1.4 L UF. Tolerated procedure well.       albumin human 25% IVPB 50 every 1 hour  atorvastatin 40 at bedtime  buDESOnide   0.5 milliGRAM(s) Respule 0.5 every 12 hours  chlorhexidine 0.12% Liquid 5 every 4 hours  chlorhexidine 2% Cloths 1 daily  dextrose 50% Injectable 50 every 15 minutes  dextrose 50% Injectable 25 every 15 minutes  epoetin haroon Injectable 59391 once  heparin  Injectable 5000 every 8 hours  insulin Infusion 1 <Continuous>  milrinone Infusion 0.25 <Continuous>  norepinephrine Infusion 0.02 <Continuous>  sodium chloride 0.9%. 1000 <Continuous>  vasopressin Infusion 0.03 <Continuous>      Allergies    No Known Allergies    Intolerances        T(C): , Max: 36.1 (12-18-18 @ 21:13)  T(F): , Max: 97 (12-19-18 @ 10:29)  HR: 88 (12-19-18 @ 10:00)  BP: 108/48 (12-19-18 @ 06:00)  BP(mean): 60 (12-19-18 @ 06:00)  RR: 16 (12-19-18 @ 10:00)  SpO2: 99% (12-19-18 @ 10:00)  Wt(kg): --    12-18 @ 07:01  -  12-19 @ 07:00  --------------------------------------------------------  IN: 3571 mL / OUT: 2400 mL / NET: 1171 mL    12-19 @ 07:01  -  12-19 @ 10:46  --------------------------------------------------------  IN: 59.1 mL / OUT: 0 mL / NET: 59.1 mL          Review of Systems:  CONSTITUTIONAL: Feels fatigue and tired, No fever or chills  EYES: No blurred or double vision.  RESPIRATORY: Mild shortness of breath, denies any cough, hemoptysis  CARDIOVASCULAR: Mild chest discomfort and dyspnea, no palpitations,  GASTROINTESTINAL: No abdominal or flank pain, No nausea or vomiting, No diarrhea  GENITOURINARY: No dysuria or urinary burning,No hematuria  NEUROLOGICAL: No headaches or blurred vision  SKIN: No skin rashes   MUSCULOSKELETAL: Bilateral leg edema+nt      PHYSICAL EXAM:  GENERAL: Ill appearing, sitting in chair on high flow oxygen, alert, awake in mild respiratory distress at present  HEAD:  Atraumatic, Normocephalic,   EYES: Bilateral conjunctival and scleral pallor+nt   Oral cavity: Oral mucosa dry and pale  NECK: Neck supple, No JVD  CHEST/LUNG: Bilateral decreased breath sounds, bibasilar minimal crepitations, no wheezing  HEART: Regular rate and rhythm. ANDREAS II/VI at LPSB, No gallop, no rub, surgical scar present  ABDOMEN: Soft, Nontender, non distended, bowel sounds present, No flank tenderness.   EXTREMITIES: Bilateral leg edema, no cyanosis, no clubbing  Neurology: AAOx3, no focal neurological deficit  SKIN: No rashes or lesions    ACCESS: LUE AVF with bruit and thrill    LABS:                        9.3    7.4   )-----------( 68       ( 19 Dec 2018 02:34 )             28.2     12-19    140  |  97  |  36<H>  ----------------------------<  116<H>  5.5<H>   |  23  |  4.16<H>    Ca    9.4      19 Dec 2018 02:33  Phos  6.1     12-19  Mg     2.0     12-19    TPro  6.0  /  Alb  3.8  /  TBili  0.6  /  DBili  x   /  AST  53<H>  /  ALT  <5<L>  /  AlkPhos  64  12-19      PT/INR - ( 19 Dec 2018 02:34 )   PT: 15.5 sec;   INR: 1.36          PTT - ( 19 Dec 2018 02:34 )  PTT:27.6 sec          RADIOLOGY & ADDITIONAL STUDIES:  < from: Xray Chest 1 View- PORTABLE-Routine (12.19.18 @ 04:45) >    EXAM:  XR CHEST PORTABLE ROUTINE 1V                          PROCEDURE DATE:  12/19/2018          INTERPRETATION:    PORTABLE CHEST X-RAY    Indication: post op    Bedside examination of the chest dated 12/19/2018 4:45 AM is compared to   the previous study of 12/18/2018.    Findings: Again poststernotomy and valve replacement surgery.   Tracheostomy tube and NG tube removed. Sided central line, right-sided   chest tubes, and mediastinal drains remain in place. No pneumothorax.   Small bibasilar atelectasis. No definite pleural effusion.   Cardiomediastinal silhouette suboptimally evaluated in this projection.    Impression: Small bibasilar atelectasis.            "Thank you for the opportunity to participate in the care of this   patient."        ROSEANNA RUIZ M.D., ATTENDING RADIOLOGIST  This document has been electronically signed. Dec 19 2018  9:37AM                  < end of copied text > Patient is a 53y Female seen and evaluated at bedside. Patient with interval extubation now on High flow oxygen therapy today morning on minimal IV pressors at present. Vital signs stable with BP ranging from 120 to 130s at present. Chest xray with pulmonary vascular congestion. Chest surgical scar with dressing noted. No active bleeding. Interval HD treatment on 12/18 with 1 L UF. Tolerated procedure well.       albumin human 25% IVPB 50 every 1 hour  atorvastatin 40 at bedtime  buDESOnide   0.5 milliGRAM(s) Respule 0.5 every 12 hours  chlorhexidine 0.12% Liquid 5 every 4 hours  chlorhexidine 2% Cloths 1 daily  dextrose 50% Injectable 50 every 15 minutes  dextrose 50% Injectable 25 every 15 minutes  epoetin haroon Injectable 22517 once  heparin  Injectable 5000 every 8 hours  insulin Infusion 1 <Continuous>  milrinone Infusion 0.25 <Continuous>  norepinephrine Infusion 0.02 <Continuous>  sodium chloride 0.9%. 1000 <Continuous>  vasopressin Infusion 0.03 <Continuous>      Allergies    No Known Allergies    Intolerances        T(C): , Max: 36.1 (12-18-18 @ 21:13)  T(F): , Max: 97 (12-19-18 @ 10:29)  HR: 88 (12-19-18 @ 10:00)  BP: 108/48 (12-19-18 @ 06:00)  BP(mean): 60 (12-19-18 @ 06:00)  RR: 16 (12-19-18 @ 10:00)  SpO2: 99% (12-19-18 @ 10:00)  Wt(kg): --    12-18 @ 07:01  -  12-19 @ 07:00  --------------------------------------------------------  IN: 3571 mL / OUT: 2400 mL / NET: 1171 mL    12-19 @ 07:01  -  12-19 @ 10:46  --------------------------------------------------------  IN: 59.1 mL / OUT: 0 mL / NET: 59.1 mL          Review of Systems:  CONSTITUTIONAL: Feels fatigue and tired, No fever or chills  EYES: No blurred or double vision.  RESPIRATORY: Mild shortness of breath, denies any cough, hemoptysis  CARDIOVASCULAR: Mild chest discomfort and dyspnea, no palpitations,  GASTROINTESTINAL: No abdominal or flank pain, No nausea or vomiting, No diarrhea  GENITOURINARY: No dysuria or urinary burning,No hematuria  NEUROLOGICAL: No headaches or blurred vision  SKIN: No skin rashes   MUSCULOSKELETAL: Bilateral leg edema+nt      PHYSICAL EXAM:  GENERAL: Ill appearing, sitting in chair on high flow oxygen, alert, awake in mild respiratory distress at present  HEAD:  Atraumatic, Normocephalic,   EYES: Bilateral conjunctival and scleral pallor+nt   Oral cavity: Oral mucosa dry and pale  NECK: Neck supple, No JVD  CHEST/LUNG: Bilateral decreased breath sounds, bibasilar minimal crepitations, no wheezing  HEART: Regular rate and rhythm. ANDREAS II/VI at LPSB, No gallop, no rub, surgical scar present  ABDOMEN: Soft, Nontender, non distended, bowel sounds present, No flank tenderness.   EXTREMITIES: Bilateral leg edema, no cyanosis, no clubbing  Neurology: AAOx3, no focal neurological deficit  SKIN: No rashes or lesions    ACCESS: LUE AVF with bruit and thrill    LABS:                        9.3    7.4   )-----------( 68       ( 19 Dec 2018 02:34 )             28.2     12-19    140  |  97  |  36<H>  ----------------------------<  116<H>  5.5<H>   |  23  |  4.16<H>    Ca    9.4      19 Dec 2018 02:33  Phos  6.1     12-19  Mg     2.0     12-19    TPro  6.0  /  Alb  3.8  /  TBili  0.6  /  DBili  x   /  AST  53<H>  /  ALT  <5<L>  /  AlkPhos  64  12-19      PT/INR - ( 19 Dec 2018 02:34 )   PT: 15.5 sec;   INR: 1.36          PTT - ( 19 Dec 2018 02:34 )  PTT:27.6 sec          RADIOLOGY & ADDITIONAL STUDIES:  < from: Xray Chest 1 View- PORTABLE-Routine (12.19.18 @ 04:45) >    EXAM:  XR CHEST PORTABLE ROUTINE 1V                          PROCEDURE DATE:  12/19/2018          INTERPRETATION:    PORTABLE CHEST X-RAY    Indication: post op    Bedside examination of the chest dated 12/19/2018 4:45 AM is compared to   the previous study of 12/18/2018.    Findings: Again poststernotomy and valve replacement surgery.   Tracheostomy tube and NG tube removed. Sided central line, right-sided   chest tubes, and mediastinal drains remain in place. No pneumothorax.   Small bibasilar atelectasis. No definite pleural effusion.   Cardiomediastinal silhouette suboptimally evaluated in this projection.    Impression: Small bibasilar atelectasis.            "Thank you for the opportunity to participate in the care of this   patient."        ROSEANNA RUIZ M.D., ATTENDING RADIOLOGIST  This document has been electronically signed. Dec 19 2018  9:37AM                  < end of copied text >

## 2018-12-19 NOTE — PROGRESS NOTE ADULT - PROBLEM SELECTOR PLAN 1
ESRD on HD via LUE AVF with TTS schedule  Last HD on 12/18 with 1.4 L UF. Tolerated procedure well  Will schedule for HD today for clearances and UF as tolerated  Low K/ Low phos renal diet. ESRD on HD via LUE AVF with TTS schedule  Last HD on 12/18 with 1 L UF. Tolerated procedure well  Will schedule for HD today for clearances and UF as tolerated  Low K/ Low phos renal diet.

## 2018-12-20 VITALS — OXYGEN SATURATION: 98 % | RESPIRATION RATE: 12 BRPM | HEART RATE: 72 BPM

## 2018-12-20 LAB
24R-OH-CALCIDIOL SERPL-MCNC: 12.1 NG/ML — LOW (ref 30–80)
ALBUMIN SERPL ELPH-MCNC: 3.8 G/DL — SIGNIFICANT CHANGE UP (ref 3.3–5)
ALBUMIN SERPL ELPH-MCNC: 4 G/DL — SIGNIFICANT CHANGE UP (ref 3.3–5)
ALP SERPL-CCNC: 101 U/L — SIGNIFICANT CHANGE UP (ref 40–120)
ALP SERPL-CCNC: 77 U/L — SIGNIFICANT CHANGE UP (ref 40–120)
ALT FLD-CCNC: <5 U/L — LOW (ref 10–45)
ALT FLD-CCNC: <5 U/L — LOW (ref 10–45)
ANION GAP SERPL CALC-SCNC: 21 MMOL/L — HIGH (ref 5–17)
ANION GAP SERPL CALC-SCNC: 30 MMOL/L — HIGH (ref 5–17)
APTT BLD: 25.6 SEC — LOW (ref 27.5–36.3)
APTT BLD: 25.6 SEC — LOW (ref 27.5–36.3)
APTT BLD: 36.4 SEC — HIGH (ref 27.5–36.3)
AST SERPL-CCNC: 25 U/L — SIGNIFICANT CHANGE UP (ref 10–40)
AST SERPL-CCNC: 33 U/L — SIGNIFICANT CHANGE UP (ref 10–40)
BASE EXCESS BLDA CALC-SCNC: -12.6 MMOL/L — LOW (ref -2–3)
BASE EXCESS BLDV CALC-SCNC: -11.3 MMOL/L — SIGNIFICANT CHANGE UP
BILIRUB SERPL-MCNC: 0.7 MG/DL — SIGNIFICANT CHANGE UP (ref 0.2–1.2)
BILIRUB SERPL-MCNC: 0.8 MG/DL — SIGNIFICANT CHANGE UP (ref 0.2–1.2)
BUN SERPL-MCNC: 31 MG/DL — HIGH (ref 7–23)
BUN SERPL-MCNC: 37 MG/DL — HIGH (ref 7–23)
CA-I BLDA-SCNC: 1.77 MMOL/L — CRITICAL HIGH (ref 1.12–1.3)
CALCIUM SERPL-MCNC: 11.4 MG/DL — HIGH (ref 8.4–10.5)
CALCIUM SERPL-MCNC: 8.4 MG/DL — SIGNIFICANT CHANGE UP (ref 8.4–10.5)
CALCIUM SERPL-MCNC: 8.9 MG/DL — SIGNIFICANT CHANGE UP (ref 8.4–10.5)
CHLORIDE SERPL-SCNC: 99 MMOL/L — SIGNIFICANT CHANGE UP (ref 96–108)
CHLORIDE SERPL-SCNC: 99 MMOL/L — SIGNIFICANT CHANGE UP (ref 96–108)
CO2 SERPL-SCNC: 18 MMOL/L — LOW (ref 22–31)
CO2 SERPL-SCNC: 22 MMOL/L — SIGNIFICANT CHANGE UP (ref 22–31)
COHGB MFR BLDA: 0.5 % — SIGNIFICANT CHANGE UP
CREAT SERPL-MCNC: 4.1 MG/DL — HIGH (ref 0.5–1.3)
CREAT SERPL-MCNC: 4.14 MG/DL — HIGH (ref 0.5–1.3)
CULTURE RESULTS: NO GROWTH — SIGNIFICANT CHANGE UP
FERRITIN SERPL-MCNC: 2102 NG/ML — HIGH (ref 15–150)
GAS PNL BLDA: SIGNIFICANT CHANGE UP
GAS PNL BLDA: SIGNIFICANT CHANGE UP
GAS PNL BLDV: SIGNIFICANT CHANGE UP
GLUCOSE BLDC GLUCOMTR-MCNC: 100 MG/DL — HIGH (ref 70–99)
GLUCOSE BLDC GLUCOMTR-MCNC: 101 MG/DL — HIGH (ref 70–99)
GLUCOSE BLDC GLUCOMTR-MCNC: 111 MG/DL — HIGH (ref 70–99)
GLUCOSE BLDC GLUCOMTR-MCNC: 121 MG/DL — HIGH (ref 70–99)
GLUCOSE SERPL-MCNC: 107 MG/DL — HIGH (ref 70–99)
GLUCOSE SERPL-MCNC: 264 MG/DL — HIGH (ref 70–99)
HCO3 BLDA-SCNC: 7 MMOL/L — CRITICAL LOW (ref 21–28)
HCO3 BLDV-SCNC: 18 MMOL/L — LOW (ref 20–27)
HCT VFR BLD CALC: 29 % — LOW (ref 34.5–45)
HCT VFR BLD CALC: 32.8 % — LOW (ref 34.5–45)
HGB BLD-MCNC: 10.9 G/DL — LOW (ref 11.5–15.5)
HGB BLD-MCNC: 9.3 G/DL — LOW (ref 11.5–15.5)
HGB BLDA-MCNC: 4.8 G/DL — CRITICAL LOW (ref 11.5–15.5)
INR BLD: 1.45 — HIGH (ref 0.88–1.16)
INR BLD: 1.77 — HIGH (ref 0.88–1.16)
LACTATE SERPL-SCNC: 1.3 MMOL/L — SIGNIFICANT CHANGE UP (ref 0.5–2)
LACTATE SERPL-SCNC: 11.2 MMOL/L — CRITICAL HIGH (ref 0.5–2)
MAGNESIUM SERPL-MCNC: 2 MG/DL — SIGNIFICANT CHANGE UP (ref 1.6–2.6)
MAGNESIUM SERPL-MCNC: 2.1 MG/DL — SIGNIFICANT CHANGE UP (ref 1.6–2.6)
MCHC RBC-ENTMCNC: 28.7 PG — SIGNIFICANT CHANGE UP (ref 27–34)
MCHC RBC-ENTMCNC: 29.2 PG — SIGNIFICANT CHANGE UP (ref 27–34)
MCHC RBC-ENTMCNC: 32.1 G/DL — SIGNIFICANT CHANGE UP (ref 32–36)
MCHC RBC-ENTMCNC: 33.2 G/DL — SIGNIFICANT CHANGE UP (ref 32–36)
MCV RBC AUTO: 86.3 FL — SIGNIFICANT CHANGE UP (ref 80–100)
MCV RBC AUTO: 90.9 FL — SIGNIFICANT CHANGE UP (ref 80–100)
METHGB MFR BLDA: 0.1 % — SIGNIFICANT CHANGE UP
O2 CT VFR BLDA CALC: SIGNIFICANT CHANGE UP (ref 15–23)
OXYHGB MFR BLDA: 98 % — SIGNIFICANT CHANGE UP (ref 94–100)
PCO2 BLDA: 6 MMHG — LOW (ref 32–45)
PCO2 BLDV: 61 MMHG — HIGH (ref 41–51)
PH BLDA: 7.69 — CRITICAL HIGH (ref 7.35–7.45)
PH BLDV: 7.09 — CRITICAL LOW (ref 7.32–7.43)
PHOSPHATE SERPL-MCNC: 5.6 MG/DL — HIGH (ref 2.5–4.5)
PHOSPHATE SERPL-MCNC: 8.7 MG/DL — HIGH (ref 2.5–4.5)
PLATELET # BLD AUTO: 42 K/UL — LOW (ref 150–400)
PLATELET # BLD AUTO: 66 K/UL — LOW (ref 150–400)
PO2 BLDA: 163 MMHG — HIGH (ref 83–108)
PO2 BLDV: 20 MMHG — SIGNIFICANT CHANGE UP
POTASSIUM BLDA-SCNC: 2.3 MMOL/L — CRITICAL LOW (ref 3.5–4.9)
POTASSIUM SERPL-MCNC: 4.6 MMOL/L — SIGNIFICANT CHANGE UP (ref 3.5–5.3)
POTASSIUM SERPL-MCNC: 4.9 MMOL/L — SIGNIFICANT CHANGE UP (ref 3.5–5.3)
POTASSIUM SERPL-SCNC: 4.6 MMOL/L — SIGNIFICANT CHANGE UP (ref 3.5–5.3)
POTASSIUM SERPL-SCNC: 4.9 MMOL/L — SIGNIFICANT CHANGE UP (ref 3.5–5.3)
PROT SERPL-MCNC: 5.6 G/DL — LOW (ref 6–8.3)
PROT SERPL-MCNC: 6.1 G/DL — SIGNIFICANT CHANGE UP (ref 6–8.3)
PROTHROM AB SERPL-ACNC: 16.6 SEC — HIGH (ref 10–12.9)
PROTHROM AB SERPL-ACNC: 20.3 SEC — HIGH (ref 10–12.9)
PTH-INTACT FLD-MCNC: 438 PG/ML — HIGH (ref 15–65)
RBC # BLD: 3.19 M/UL — LOW (ref 3.8–5.2)
RBC # BLD: 3.8 M/UL — SIGNIFICANT CHANGE UP (ref 3.8–5.2)
RBC # FLD: 16.3 % — SIGNIFICANT CHANGE UP (ref 10.3–16.9)
RBC # FLD: 16.7 % — SIGNIFICANT CHANGE UP (ref 10.3–16.9)
SAO2 % BLDA: 99 % — SIGNIFICANT CHANGE UP (ref 95–100)
SAO2 % BLDV: 18 % — SIGNIFICANT CHANGE UP
SODIUM BLDA-SCNC: 136 MMOL/L — LOW (ref 138–146)
SODIUM SERPL-SCNC: 142 MMOL/L — SIGNIFICANT CHANGE UP (ref 135–145)
SODIUM SERPL-SCNC: 147 MMOL/L — HIGH (ref 135–145)
SPECIMEN SOURCE: SIGNIFICANT CHANGE UP
VIT D25+D1,25 OH+D1,25 PNL SERPL-MCNC: 25.4 PG/ML — SIGNIFICANT CHANGE UP (ref 19.9–79.3)
WBC # BLD: 6.9 K/UL — SIGNIFICANT CHANGE UP (ref 3.8–10.5)
WBC # BLD: 7.5 K/UL — SIGNIFICANT CHANGE UP (ref 3.8–10.5)
WBC # FLD AUTO: 6.9 K/UL — SIGNIFICANT CHANGE UP (ref 3.8–10.5)
WBC # FLD AUTO: 7.5 K/UL — SIGNIFICANT CHANGE UP (ref 3.8–10.5)

## 2018-12-20 RX ORDER — ACETAMINOPHEN 500 MG
1000 TABLET ORAL ONCE
Qty: 0 | Refills: 0 | Status: COMPLETED | OUTPATIENT
Start: 2018-12-20 | End: 2018-12-20

## 2018-12-20 RX ORDER — ONDANSETRON 8 MG/1
4 TABLET, FILM COATED ORAL ONCE
Qty: 0 | Refills: 0 | Status: COMPLETED | OUTPATIENT
Start: 2018-12-20 | End: 2018-12-20

## 2018-12-20 RX ADMIN — Medication 1000 MILLIGRAM(S): at 02:10

## 2018-12-20 RX ADMIN — CHLORHEXIDINE GLUCONATE 5 MILLILITER(S): 213 SOLUTION TOPICAL at 02:02

## 2018-12-20 RX ADMIN — ONDANSETRON 4 MILLIGRAM(S): 8 TABLET, FILM COATED ORAL at 01:30

## 2018-12-20 RX ADMIN — Medication 400 MILLIGRAM(S): at 01:45

## 2018-12-20 NOTE — PROVIDER CONTACT NOTE (CHANGE IN STATUS NOTIFICATION) - BACKGROUND
pt's post-op day 2 of AVR and MV repair, un-lenka anomalous RCA. Intubated to ventilator. pt's on CVVHD

## 2018-12-20 NOTE — DISCHARGE NOTE FOR THE EXPIRED PATIENT - HOSPITAL COURSE
53 year old female with HTN, HLD, ESRD on HD, AS, MR, Anomalous RCA s/p on 2018 AVR/MV repair with unroofing of RCA. Arrived to 9E afer operation on Epinephrine Levophed, Vasopressin, Milrinone. Started on IV Amiodarone for AF. + hypotension with response to V pacing and pressors. TTE: No tamponade.   2018: HD done. AV paced at 80 then 90. Extubated at 18:30   18: Was doing well oob few steps. Precipitous drop in BP on initiation of slow HD near arrest. Hemodynamics regained with rescue measures. Intubated uneventfully. Neurological status maintained.   PA catheter placed PA pressures 70's. Vasopressin Primacor on. CVVH started. Tolerated start. Sudden drop in blood pressor. Code called. Arrest. Resuscitation efforts unsuccessful. Patient . TOD: See above. Principal Discharge DX:	Diarrhea of presumed infectious origin

## 2018-12-20 NOTE — PROGRESS NOTE ADULT - REASON FOR ADMISSION
Anomalous RCA, AS

## 2018-12-20 NOTE — PROVIDER CONTACT NOTE (CHANGE IN STATUS NOTIFICATION) - ASSESSMENT
pt's sleepy but alert. pt's on vaso and primacor gtts. pt's hemodynamically unstable and SBP decreased at this time

## 2018-12-20 NOTE — PROVIDER CONTACT NOTE (CHANGE IN STATUS NOTIFICATION) - SITUATION
pt's hemodynamically unstable and SBP continued to decrease. Dr. Aguilar made aware at bedside and code team activated

## 2018-12-20 NOTE — PROGRESS NOTE ADULT - SUBJECTIVE AND OBJECTIVE BOX
CTICU  CRITICAL  CARE  attending     Hand off received 					   Pertinent clinical, laboratory, radiographic, hemodynamic, echocardiographic, respiratory data, microbiologic data and chart were reviewed and analyzed frequently throughout the course of the day and night  Patient seen and examined with CTS/ SH attending at bedside      53 year old female, Thai speaking, former smoker, with a past medical history of hypertension, hyperlipidemia, PE (2016, on anticoagulation for 6 months), ESRD on dialysis (Tues, Thurs, Sat.), iron deficient anemia, consulted by CT Surgery for management of aortic stenosis and an anomalous RCA.   Patient presented to her cardiologist Dr. India Haley complaining of exertional dyspnea and angina with moderate activity (beginning since April). She reports that she gets winded after walking up 1 flight of stairs and 1-2 city blocks. She also endorses bilateral lower extremity edema. Patient denies any fever, chills, fatigue, syncope, acute chest pain, palpitations, SOB, orthopnea, paroxysmal nocturnal dyspnea, vomiting, abdominal pain, or back pain.  Due to patient's symptoms, she underwent the tests below:  Cardiac catheterization on 18 revealed: Nonobstructive CAD. 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   Echocardiogram 18 revealed: LVEF 55-60%. Aortic  valve is trileaflet and is moderately thickened. Moderate  aortic regurgitation. Severe  aortic stenosis with a peak gradient of 77.43mmhg and mean gradient of 49.28mmHg  Extubated 18.  Reintubated 18.  Arrested 18: CPR performed.  Patient pronounced 4:43 AM.        HEALTH ISSUES - PROBLEM Dx:  Cardiogenic shock: Cardiogenic shock  Renal bone disease: Renal bone disease  Anemia: Anemia  Essential hypertension: Essential hypertension  ESRD (end stage renal disease): ESRD (end stage renal disease)      FAMILY HISTORY:  Family history of diabetes mellitus (Mother)  PAST MEDICAL & SURGICAL HISTORY:  Pulmonary embolism  ESRD (end stage renal disease)  Hyperlipemia  Hemodialysis access, AV graft  Kidney failure  Diabetes  Essential hypertension  H/O  section          ICU Vital Signs Last 24 Hrs  T(C): 36 (20 Dec 2018 02:00), Max: 36.3 (19 Dec 2018 12:15)  T(F): 96.8 (20 Dec 2018 02:00), Max: 97.3 (19 Dec 2018 12:15)  HR: 72 (20 Dec 2018 03:00) (62 - 132)  BP: 108/48 (19 Dec 2018 06:00) (108/48 - 108/48)  BP(mean): 60 (19 Dec 2018 06:00) (60 - 60)  ABP: 98/46 (20 Dec 2018 03:00) (46/34 - 276/82)  ABP(mean): 60 (20 Dec 2018 03:00) (0 - 142)  RR: 12 (20 Dec 2018 03:) (9 - 25)  SpO2: 100% (20 Dec 2018 03:) (94% - 100%)    Adult Advanced Hemodynamics Last 24 Hrs  CVP(mm Hg): --  CVP(cm H2O): --  CO: 5.2 (20 Dec 2018 02:) (4 - 7.2)  CI: 2.8 (20 Dec 2018 02:) (2.2 - 3.8)  PA: 64/28 (20 Dec 2018 03:00) (23/15 - 74/37)  PA(mean): 41 (20 Dec 2018 03:00) (17 - 54)  PCWP: --  SVR: --  SVRI: --  PVR: --  PVRI: --, ABG - ( 20 Dec 2018 04:29 )  pH, Arterial: 7.03  pH, Blood: x     /  pCO2: 75    /  pO2: 12    / HCO3: 19    / Base Excess: -11.1 /  SaO2: not analyzed                 Intake and output was reviewed and the fluid balance was calculated  Daily     Daily Weight in k.5 (19 Dec 2018 10:56)  I&O's Summary    18 Dec 2018 07:01  -  19 Dec 2018 07:00  --------------------------------------------------------  IN: 3571 mL / OUT: 2400 mL / NET: 1171 mL    19 Dec 2018 07:01  -  20 Dec 2018 05:23  --------------------------------------------------------  IN: 3711.4 mL / OUT: 200 mL / NET: 3511.4 mL            labs  CBC Full  -  ( 20 Dec 2018 04:28 )  WBC Count : 6.9 K/uL  Hemoglobin : 9.3 g/dL  Hematocrit : 29.0 %  Platelet Count - Automated : 42 K/uL  Mean Cell Volume : 90.9 fL  Mean Cell Hemoglobin : 29.2 pg  Mean Cell Hemoglobin Concentration : 32.1 g/dL  Auto Neutrophil # : x  Auto Lymphocyte # : x  Auto Monocyte # : x  Auto Eosinophil # : x  Auto Basophil # : x  Auto Neutrophil % : x  Auto Lymphocyte % : x  Auto Monocyte % : x  Auto Eosinophil % : x  Auto Basophil % : x    12-20    147<H>  |  99  |  31<H>  ----------------------------<  264<H>  4.9   |  18<L>  |  4.10<H>    Ca    8.9      20 Dec 2018 03:03  Phos  8.7     12-20  Mg     2.1     12-20    TPro  5.6<L>  /  Alb  4.0  /  TBili  0.7  /  DBili  x   /  AST  25  /  ALT  <5<L>  /  AlkPhos  77  12-20    PT/INR - ( 20 Dec 2018 04:33 )   PT: 20.3 sec;   INR: 1.77          PTT - ( 20 Dec 2018 04:33 )  PTT:36.4 sec  The current medications were reviewed   MEDICATIONS  (STANDING):  atorvastatin 40 milliGRAM(s) Oral at bedtime  buDESOnide   0.5 milliGRAM(s) Respule 0.5 milliGRAM(s) Inhalation every 12 hours  chlorhexidine 0.12% Liquid 5 milliLiter(s) Oral Mucosa every 4 hours  chlorhexidine 2% Cloths 1 Application(s) Topical daily  CRRT Treatment    <Continuous>  dextrose 50% Injectable 50 milliLiter(s) IV Push every 15 minutes  dextrose 50% Injectable 25 milliLiter(s) IV Push every 15 minutes  insulin Infusion 1 Unit(s)/Hr (1 mL/Hr) IV Continuous <Continuous>  milrinone Infusion 0.25 MICROgram(s)/kG/Min (6.195 mL/Hr) IV Continuous <Continuous>  norepinephrine Infusion 0.02 MICROgram(s)/kG/Min (3.097 mL/Hr) IV Continuous <Continuous>  propofol Infusion 20 MICROgram(s)/kG/Min (9.912 mL/Hr) IV Continuous <Continuous>  PureFlow Dialysate RFP-400 (K 2 / Ca 3) 5000 milliLiter(s) (2000 mL/Hr) CRRT <Continuous>  sodium chloride 0.9%. 1000 milliLiter(s) (100 mL/Hr) IV Continuous <Continuous>  vasopressin Infusion 0.03 Unit(s)/Min (1.8 mL/Hr) IV Continuous <Continuous>    MEDICATIONS  (PRN):       PROBLEM LIST/ ASSESSMENT:  HEALTH ISSUES - PROBLEM Dx:  Cardiogenic shock: Cardiogenic shock  Renal bone disease: Renal bone disease  Anemia: Anemia  Essential hypertension: Essential hypertension  ESRD (end stage renal disease): ESRD (end stage renal disease)  RIGHT heart failure      Patient is a 53y old  Female who presents with  chief complaint of Anomalous RCA, AS, MR.  S/P AVR  S/P MV Repair  S/P Unroofing of RCA.  S/P Extubation 18.  S/P Hemodialysis (Did not tolerate) -> Hemodynamic instability-> Reintubated. PA catheter inserted.    Hyperkalemia improved.  Right femoral access HD catheter inserted.  Patient started on CVVHD Tolerated well at fluid removal rate of 50 cc per hour.  Patient suddenly had an episode of hypotension and pulseless electrical activity.  Dr. Nascimento notified.  CPR started.  ACLS protocol followed.  IV fluids, IV epinephrine, vasopressin and phenylephrine given.  Transient improvement in BP followed by drop in BP.  New Right femoral arterial line inserted.  No evidence of tamponade on the bedside echo.  Resuscitation   continued for about one hour and fifteen minutes.  Patient pronounced  4:43 AM.  Family notified in person with RN and PA.  Organ donor network notified. Patient not considered an optimal candidate for tissue or organ donation.            CRITICAL CARE TIME SPENT in evaluation and management, reassessments, review and interpretation of labs and x-rays, ventilator and hemodynamic management, formulating a plan and coordinating care: ___120____ MIN.  Time does not include procedural time.  CTICU ATTENDING     					    Parker Aguilar MD

## 2018-12-21 LAB — SURGICAL PATHOLOGY STUDY: SIGNIFICANT CHANGE UP

## 2018-12-22 DIAGNOSIS — E11.9 TYPE 2 DIABETES MELLITUS WITHOUT COMPLICATIONS: ICD-10-CM

## 2018-12-22 DIAGNOSIS — R57.0 CARDIOGENIC SHOCK: ICD-10-CM

## 2018-12-22 DIAGNOSIS — N25.0 RENAL OSTEODYSTROPHY: ICD-10-CM

## 2018-12-22 DIAGNOSIS — Z87.891 PERSONAL HISTORY OF NICOTINE DEPENDENCE: ICD-10-CM

## 2018-12-22 DIAGNOSIS — I35.1 NONRHEUMATIC AORTIC (VALVE) INSUFFICIENCY: ICD-10-CM

## 2018-12-22 DIAGNOSIS — I35.2 NONRHEUMATIC AORTIC (VALVE) STENOSIS WITH INSUFFICIENCY: ICD-10-CM

## 2018-12-22 DIAGNOSIS — D63.1 ANEMIA IN CHRONIC KIDNEY DISEASE: ICD-10-CM

## 2018-12-22 DIAGNOSIS — E87.5 HYPERKALEMIA: ICD-10-CM

## 2018-12-22 DIAGNOSIS — Z79.84 LONG TERM (CURRENT) USE OF ORAL HYPOGLYCEMIC DRUGS: ICD-10-CM

## 2018-12-22 DIAGNOSIS — E87.2 ACIDOSIS: ICD-10-CM

## 2018-12-22 DIAGNOSIS — Q23.1 CONGENITAL INSUFFICIENCY OF AORTIC VALVE: ICD-10-CM

## 2018-12-22 DIAGNOSIS — I10 ESSENTIAL (PRIMARY) HYPERTENSION: ICD-10-CM

## 2018-12-22 DIAGNOSIS — N18.6 END STAGE RENAL DISEASE: ICD-10-CM

## 2018-12-22 DIAGNOSIS — Z99.2 DEPENDENCE ON RENAL DIALYSIS: ICD-10-CM

## 2018-12-22 DIAGNOSIS — I34.0 NONRHEUMATIC MITRAL (VALVE) INSUFFICIENCY: ICD-10-CM

## 2018-12-22 DIAGNOSIS — Q24.5 MALFORMATION OF CORONARY VESSELS: ICD-10-CM

## 2018-12-24 ENCOUNTER — RESULT REVIEW (OUTPATIENT)
Age: 53
End: 2018-12-24

## 2019-01-09 PROCEDURE — 93306 TTE W/DOPPLER COMPLETE: CPT

## 2019-01-09 PROCEDURE — 86803 HEPATITIS C AB TEST: CPT

## 2019-01-09 PROCEDURE — 83970 ASSAY OF PARATHORMONE: CPT

## 2019-01-09 PROCEDURE — 94002 VENT MGMT INPAT INIT DAY: CPT

## 2019-01-09 PROCEDURE — 97161 PT EVAL LOW COMPLEX 20 MIN: CPT

## 2019-01-09 PROCEDURE — C1889: CPT

## 2019-01-09 PROCEDURE — 84132 ASSAY OF SERUM POTASSIUM: CPT

## 2019-01-09 PROCEDURE — 83540 ASSAY OF IRON: CPT

## 2019-01-09 PROCEDURE — 84702 CHORIONIC GONADOTROPIN TEST: CPT

## 2019-01-09 PROCEDURE — P9035: CPT

## 2019-01-09 PROCEDURE — 85730 THROMBOPLASTIN TIME PARTIAL: CPT

## 2019-01-09 PROCEDURE — 88311 DECALCIFY TISSUE: CPT

## 2019-01-09 PROCEDURE — 85610 PROTHROMBIN TIME: CPT

## 2019-01-09 PROCEDURE — 36430 TRANSFUSION BLD/BLD COMPNT: CPT

## 2019-01-09 PROCEDURE — 84100 ASSAY OF PHOSPHORUS: CPT

## 2019-01-09 PROCEDURE — 86705 HEP B CORE ANTIBODY IGM: CPT

## 2019-01-09 PROCEDURE — 87070 CULTURE OTHR SPECIMN AEROBIC: CPT

## 2019-01-09 PROCEDURE — P9012: CPT

## 2019-01-09 PROCEDURE — 93005 ELECTROCARDIOGRAM TRACING: CPT

## 2019-01-09 PROCEDURE — 82330 ASSAY OF CALCIUM: CPT

## 2019-01-09 PROCEDURE — 87340 HEPATITIS B SURFACE AG IA: CPT

## 2019-01-09 PROCEDURE — 82306 VITAMIN D 25 HYDROXY: CPT

## 2019-01-09 PROCEDURE — 85025 COMPLETE CBC W/AUTO DIFF WBC: CPT

## 2019-01-09 PROCEDURE — 86900 BLOOD TYPING SEROLOGIC ABO: CPT

## 2019-01-09 PROCEDURE — 86706 HEP B SURFACE ANTIBODY: CPT

## 2019-01-09 PROCEDURE — 36415 COLL VENOUS BLD VENIPUNCTURE: CPT

## 2019-01-09 PROCEDURE — 71045 X-RAY EXAM CHEST 1 VIEW: CPT

## 2019-01-09 PROCEDURE — P9047: CPT

## 2019-01-09 PROCEDURE — 86850 RBC ANTIBODY SCREEN: CPT

## 2019-01-09 PROCEDURE — P9017: CPT

## 2019-01-09 PROCEDURE — 80053 COMPREHEN METABOLIC PANEL: CPT

## 2019-01-09 PROCEDURE — 94640 AIRWAY INHALATION TREATMENT: CPT

## 2019-01-09 PROCEDURE — 83735 ASSAY OF MAGNESIUM: CPT

## 2019-01-09 PROCEDURE — 94660 CPAP INITIATION&MGMT: CPT

## 2019-01-09 PROCEDURE — 80048 BASIC METABOLIC PNL TOTAL CA: CPT

## 2019-01-09 PROCEDURE — 85018 HEMOGLOBIN: CPT

## 2019-01-09 PROCEDURE — 86923 COMPATIBILITY TEST ELECTRIC: CPT

## 2019-01-09 PROCEDURE — 83605 ASSAY OF LACTIC ACID: CPT

## 2019-01-09 PROCEDURE — 82310 ASSAY OF CALCIUM: CPT

## 2019-01-09 PROCEDURE — 90935 HEMODIALYSIS ONE EVALUATION: CPT

## 2019-01-09 PROCEDURE — 82728 ASSAY OF FERRITIN: CPT

## 2019-01-09 PROCEDURE — 88305 TISSUE EXAM BY PATHOLOGIST: CPT

## 2019-01-09 PROCEDURE — 85027 COMPLETE CBC AUTOMATED: CPT

## 2019-01-09 PROCEDURE — 86901 BLOOD TYPING SEROLOGIC RH(D): CPT

## 2019-01-09 PROCEDURE — 80076 HEPATIC FUNCTION PANEL: CPT

## 2019-01-09 PROCEDURE — 83550 IRON BINDING TEST: CPT

## 2019-01-09 PROCEDURE — 82652 VIT D 1 25-DIHYDROXY: CPT

## 2019-01-09 PROCEDURE — 84295 ASSAY OF SERUM SODIUM: CPT

## 2019-01-09 PROCEDURE — 82962 GLUCOSE BLOOD TEST: CPT

## 2019-01-09 PROCEDURE — P9045: CPT

## 2019-01-09 PROCEDURE — 82803 BLOOD GASES ANY COMBINATION: CPT

## 2019-01-09 PROCEDURE — P9016: CPT

## 2020-05-14 PROBLEM — I35.0 SEVERE AORTIC STENOSIS: Status: ACTIVE | Noted: 2018-11-28

## 2023-10-01 PROBLEM — I20.89 EXERTIONAL ANGINA: Status: ACTIVE | Noted: 2018-12-06
